# Patient Record
Sex: FEMALE | Race: BLACK OR AFRICAN AMERICAN | Employment: FULL TIME | URBAN - METROPOLITAN AREA
[De-identification: names, ages, dates, MRNs, and addresses within clinical notes are randomized per-mention and may not be internally consistent; named-entity substitution may affect disease eponyms.]

---

## 2017-02-13 ENCOUNTER — OP HISTORICAL/CONVERTED ENCOUNTER (OUTPATIENT)
Dept: OTHER | Age: 42
End: 2017-02-13

## 2019-01-11 ENCOUNTER — OP HISTORICAL/CONVERTED ENCOUNTER (OUTPATIENT)
Dept: OTHER | Age: 44
End: 2019-01-11

## 2019-02-14 ENCOUNTER — OP HISTORICAL/CONVERTED ENCOUNTER (OUTPATIENT)
Dept: OTHER | Age: 44
End: 2019-02-14

## 2019-05-10 ENCOUNTER — ED HISTORICAL/CONVERTED ENCOUNTER (OUTPATIENT)
Dept: OTHER | Age: 44
End: 2019-05-10

## 2020-07-29 VITALS
DIASTOLIC BLOOD PRESSURE: 86 MMHG | HEIGHT: 62 IN | WEIGHT: 159.2 LBS | HEART RATE: 100 BPM | BODY MASS INDEX: 29.3 KG/M2 | OXYGEN SATURATION: 98 % | TEMPERATURE: 98.7 F | SYSTOLIC BLOOD PRESSURE: 131 MMHG

## 2020-07-29 PROBLEM — J44.9 COPD (CHRONIC OBSTRUCTIVE PULMONARY DISEASE) (HCC): Status: ACTIVE | Noted: 2020-07-29

## 2020-07-29 PROBLEM — I10 ESSENTIAL HYPERTENSION: Status: ACTIVE | Noted: 2020-07-29

## 2020-07-29 PROBLEM — F10.10 ALCOHOL ABUSE: Status: ACTIVE | Noted: 2020-07-29

## 2020-07-29 PROBLEM — F41.8 MIXED ANXIETY AND DEPRESSIVE DISORDER: Status: ACTIVE | Noted: 2020-07-29

## 2020-07-29 PROBLEM — F32.A DEPRESSIVE DISORDER: Status: ACTIVE | Noted: 2020-07-29

## 2020-07-29 PROBLEM — M54.9 BACKACHE: Status: ACTIVE | Noted: 2020-07-29

## 2020-07-29 PROBLEM — F17.210 TOBACCO DEPENDENCE DUE TO CIGARETTES: Status: ACTIVE | Noted: 2020-07-29

## 2020-07-29 PROBLEM — E55.9 VITAMIN D DEFICIENCY: Status: ACTIVE | Noted: 2020-07-29

## 2020-07-30 ENCOUNTER — VIRTUAL VISIT (OUTPATIENT)
Dept: INTERNAL MEDICINE CLINIC | Age: 45
End: 2020-07-30
Payer: COMMERCIAL

## 2020-07-30 DIAGNOSIS — F17.210 TOBACCO DEPENDENCE DUE TO CIGARETTES: ICD-10-CM

## 2020-07-30 DIAGNOSIS — J44.9 CHRONIC OBSTRUCTIVE PULMONARY DISEASE, UNSPECIFIED COPD TYPE (HCC): Primary | ICD-10-CM

## 2020-07-30 DIAGNOSIS — F41.8 MIXED ANXIETY AND DEPRESSIVE DISORDER: ICD-10-CM

## 2020-07-30 PROCEDURE — 99213 OFFICE O/P EST LOW 20 MIN: CPT | Performed by: INTERNAL MEDICINE

## 2020-07-30 RX ORDER — CETIRIZINE HCL 10 MG
10 TABLET ORAL DAILY
COMMUNITY

## 2020-07-30 RX ORDER — ALPRAZOLAM 0.5 MG/1
0.5 TABLET ORAL
Qty: 30 TAB | Refills: 1 | Status: SHIPPED | OUTPATIENT
Start: 2020-07-30 | End: 2020-08-29

## 2020-07-30 RX ORDER — ACAMPROSATE CALCIUM 333 MG/1
666 TABLET, DELAYED RELEASE ORAL 3 TIMES DAILY
COMMUNITY

## 2020-07-30 RX ORDER — ALBUTEROL SULFATE 2.5 MG/.5ML
2.5 SOLUTION RESPIRATORY (INHALATION)
COMMUNITY
End: 2021-12-08 | Stop reason: SDUPTHER

## 2020-07-30 RX ORDER — FLUTICASONE FUROATE AND VILANTEROL TRIFENATATE 100; 25 UG/1; UG/1
1 POWDER RESPIRATORY (INHALATION) DAILY
COMMUNITY
End: 2020-07-30 | Stop reason: SDUPTHER

## 2020-07-30 RX ORDER — MOMETASONE FUROATE 200 UG/1
2 AEROSOL RESPIRATORY (INHALATION) 2 TIMES DAILY
COMMUNITY
End: 2020-07-30

## 2020-07-30 RX ORDER — MELOXICAM 15 MG/1
15 TABLET ORAL
COMMUNITY
End: 2020-11-16

## 2020-07-30 RX ORDER — FOLIC ACID 1 MG/1
1 TABLET ORAL DAILY
COMMUNITY
End: 2021-12-08 | Stop reason: ALTCHOICE

## 2020-07-30 RX ORDER — METOPROLOL TARTRATE 50 MG/1
TABLET ORAL EVERY 12 HOURS
COMMUNITY
End: 2021-01-08

## 2020-07-30 RX ORDER — BUPROPION HYDROCHLORIDE 150 MG/1
150 TABLET, EXTENDED RELEASE ORAL 2 TIMES DAILY
COMMUNITY
End: 2020-07-30 | Stop reason: ALTCHOICE

## 2020-07-30 RX ORDER — HYDROXYZINE 25 MG/1
TABLET, FILM COATED ORAL
COMMUNITY
End: 2021-12-08 | Stop reason: ALTCHOICE

## 2020-07-30 RX ORDER — LANOLIN ALCOHOL/MO/W.PET/CERES
CREAM (GRAM) TOPICAL DAILY
COMMUNITY
End: 2021-12-08 | Stop reason: ALTCHOICE

## 2020-07-30 RX ORDER — ALPRAZOLAM 0.5 MG/1
0.5 TABLET ORAL
COMMUNITY
End: 2020-07-30 | Stop reason: SDUPTHER

## 2020-07-30 RX ORDER — SERTRALINE HYDROCHLORIDE 100 MG/1
100 TABLET, FILM COATED ORAL DAILY
COMMUNITY
End: 2021-12-08 | Stop reason: CLARIF

## 2020-07-30 RX ORDER — MOMETASONE FUROATE AND FORMOTEROL FUMARATE DIHYDRATE 100; 5 UG/1; UG/1
2 AEROSOL RESPIRATORY (INHALATION) 2 TIMES DAILY
COMMUNITY
End: 2020-07-30 | Stop reason: ALTCHOICE

## 2020-07-30 RX ORDER — FLUTICASONE FUROATE AND VILANTEROL TRIFENATATE 100; 25 UG/1; UG/1
1 POWDER RESPIRATORY (INHALATION) DAILY
Qty: 3 INHALER | Refills: 1 | Status: SHIPPED | OUTPATIENT
Start: 2020-07-30 | End: 2021-07-23

## 2020-07-30 NOTE — PROGRESS NOTES
Jamar Potts is a 53 y.o. female and presents with Anxiety and Hypertension    THIS VISIT WAS COMPLETED VIA PHONE, WITH PATIENTS CONSENT IN THE SETTING OF CORONAVIRUS PANDEMIC, SHE COULD NOT FOLLOW INSTRUCTIONS TO ACCESS DOXY. ME    COPD: Last visit in May we started Spiriva, she mentioned back then worsening shortness of breath to 1 block, she continued Breo, I referred her to pulmonary, she did not schedule appointment, she lost the referral.   She says the Spiriva has helped her a lot, she does not feel as short of breath    Alcoholism: She has been on acamprosate, last visit she had reduced from 6 beers per day to 1 ounce of beer a day, he has been taking thiamine and folic acid. She says she's still drinking the same     Mixed anxiety and depression:  Last visit depression was better, anxiety was better as well but more anxious and depressed, she has been taking sertraline 100 mg and Xanax as needed only, around 2 times a week, she takes it when she feels very anxious. Needs refill in Xanax   She says she gets very emotional and very dan before her menstrual cycle. Psychiatry referral.   She wanted to keep taking bupropion for smoking cessation, she is smoking now 1/2 a pack a day, has reduced from 2 packs a day     HTN: No blood pressure available today. Needs refill     Review of Systems  Review of Systems   Constitutional: Negative for chills, fatigue, fever and unexpected weight change. HENT: Negative for congestion, ear pain, sneezing and sore throat. Eyes: Negative for pain and discharge. Respiratory: Negative for cough, shortness of breath and wheezing. Cardiovascular: Negative for chest pain, palpitations and leg swelling. Gastrointestinal: Negative for abdominal pain, blood in stool, constipation and diarrhea. Endocrine: Negative for polydipsia and polyuria. Genitourinary: Negative for difficulty urinating, dysuria, frequency, hematuria and urgency.    Musculoskeletal: Negative for arthralgias, back pain and joint swelling. Skin: Negative for rash. Allergic/Immunologic: Negative for environmental allergies and food allergies. Neurological: Negative for dizziness, speech difficulty, weakness, light-headedness, numbness and headaches. Hematological: Negative for adenopathy. Psychiatric/Behavioral: Negative for behavioral problems (Depression), sleep disturbance and suicidal ideas. The patient is nervous/anxious. Past Medical History:   Diagnosis Date    Anemia     Arthritis     Depression     GERD (gastroesophageal reflux disease)     Headache     Lung disorder     Sleep disorder      Past Surgical History:   Procedure Laterality Date    HX HERNIA REPAIR       Social History     Socioeconomic History    Marital status: SINGLE     Spouse name: Not on file    Number of children: Not on file    Years of education: Not on file    Highest education level: Not on file   Tobacco Use    Smoking status: Current Every Day Smoker     Packs/day: 0.50     Years: 15.00     Pack years: 7.50    Smokeless tobacco: Never Used   Substance and Sexual Activity    Alcohol use: Yes     Comment: Heavy     Drug use: Never     Family History   Problem Relation Age of Onset    Hypertension Mother     Diabetes Mother     Heart Disease Father     Bipolar Disorder Paternal Aunt     Schizophrenia Paternal Aunt     Hypertension Maternal Grandmother     Diabetes Maternal Grandfather     Heart Disease Maternal Grandfather      Current Outpatient Medications   Medication Sig Dispense Refill    acamprosate (CAMPRAL) 333 mg tablet Take 666 mg by mouth three (3) times daily.  albuterol sulfate (PROVENTIL;VENTOLIN) 2.5 mg/0.5 mL nebu nebulizer solution 2.5 mg by Nebulization route four (4) times daily as needed for Wheezing.  cetirizine (ZYRTEC) 10 mg tablet Take 10 mg by mouth daily.  folic acid (FOLVITE) 1 mg tablet Take 1 mg by mouth daily.       hydrOXYzine HCL (ATARAX) 25 mg tablet Take  by mouth daily as needed.  meloxicam (MOBIC) 15 mg tablet Take 15 mg by mouth daily as needed for Pain.  metoprolol tartrate (LOPRESSOR) 50 mg tablet Take  by mouth every twelve (12) hours.  sertraline (ZOLOFT) 100 mg tablet Take 100 mg by mouth daily.  thiamine HCL (Vitamin B-1) 100 mg tablet Take  by mouth daily.  ALPRAZolam (XANAX) 0.5 mg tablet Take 1 Tab by mouth daily as needed for Anxiety for up to 30 days. Indications: anxiousness associated with depression 30 Tab 1    tiotropium (Spiriva with HandiHaler) 18 mcg inhalation capsule Take 1 Cap by inhalation daily for 270 days. 90 Cap 2    fluticasone furoate-vilanteroL (Breo Ellipta) 100-25 mcg/dose inhaler Take 1 Puff by inhalation daily for 270 days. Indications: bronchospasm prevention with COPD 3 Inhaler 1     No Known Allergies    Objective: There were no vitals taken for this visit. Physical Exam:   Physical Exam  Neurological:      Mental Status: She is alert and oriented to person, place, and time. Psychiatric:         Mood and Affect: Mood normal.         Behavior: Behavior normal.         Thought Content: Thought content normal.         Judgment: Judgment normal.          No results found for this or any previous visit. Assessment/Plan:    ICD-10-CM ICD-9-CM    1. Chronic obstructive pulmonary disease, unspecified COPD type ([facility]  J44.9 496  she is doing better since she started the Spiriva 2 months ago, shortness of breath has improved, needs refill of Spiriva, continue Breo, she never went to see pulmonary ordering referral again. tiotropium (Spiriva with HandiHaler) 18 mcg inhalation capsule      fluticasone furoate-vilanteroL (Breo Ellipta) 100-25 mcg/dose inhaler      REFERRAL TO PULMONARY DISEASE   2.  Mixed anxiety and depressive disorder  F41.8 300.4  uncontrolled, continue sertraline, instructed her to stop bupropion for medication interaction, she needs to see psychiatry, she mentioned during the call that she also has PTSD and ADHD which she did not tell me before and the is not quite clear for me, she is to see psychiatry    ALPRAZolam (XANAX) 0.5 mg tablet      REFERRAL TO PSYCHIATRY   3. Tobacco dependence due to cigarettes  F17.210 305.1  still smoking has decreased amount, encouraged     Orders Placed This Encounter    REFERRAL TO PULMONARY DISEASE     Referral Priority:   Routine     Referral Type:   Consultation     Referral Reason:   Specialty Services Required     Referred to Provider:   Hernandez Brower MD     Requested Specialty:   Pulmonary Disease     Number of Visits Requested:   1    REFERRAL TO PSYCHIATRY     Referral Priority:   Routine     Referral Type:   Behavioral Health     Referral Reason:   Specialty Services Required     Referred to Provider:   Luis Garces MD     Requested Specialty:   Psychiatry     Number of Visits Requested:   1    acamprosate (CAMPRAL) 333 mg tablet     Sig: Take 666 mg by mouth three (3) times daily.  albuterol sulfate (PROVENTIL;VENTOLIN) 2.5 mg/0.5 mL nebu nebulizer solution     Si.5 mg by Nebulization route four (4) times daily as needed for Wheezing.  DISCONTD: ALPRAZolam (XANAX) 0.5 mg tablet     Sig: Take 0.5 mg by mouth two (2) times daily as needed for Anxiety.  DISCONTD: fluticasone furoate-vilanteroL (Breo Ellipta) 100-25 mcg/dose inhaler     Sig: Take 1 Puff by inhalation daily.  DISCONTD: mometasone (Asmanex HFA) 200 mcg/actuation HFAA inhaler     Sig: Take 2 Puffs by inhalation two (2) times a day.  DISCONTD: buPROPion SR (WELLBUTRIN SR) 150 mg SR tablet     Sig: Take 150 mg by mouth two (2) times a day.  cetirizine (ZYRTEC) 10 mg tablet     Sig: Take 10 mg by mouth daily.  DISCONTD: mometasone-formoterol (Dulera) 100-5 mcg/actuation HFA inhaler     Sig: Take 2 Puffs by inhalation two (2) times a day.  folic acid (FOLVITE) 1 mg tablet     Sig: Take 1 mg by mouth daily.     hydrOXYzine HCL (ATARAX) 25 mg tablet     Sig: Take  by mouth daily as needed.  meloxicam (MOBIC) 15 mg tablet     Sig: Take 15 mg by mouth daily as needed for Pain.  metoprolol tartrate (LOPRESSOR) 50 mg tablet     Sig: Take  by mouth every twelve (12) hours.  sertraline (ZOLOFT) 100 mg tablet     Sig: Take 100 mg by mouth daily.  DISCONTD: tiotropium (Spiriva with HandiHaler) 18 mcg inhalation capsule     Sig: Take 1 Cap by inhalation daily.  thiamine HCL (Vitamin B-1) 100 mg tablet     Sig: Take  by mouth daily.  ALPRAZolam (XANAX) 0.5 mg tablet     Sig: Take 1 Tab by mouth daily as needed for Anxiety for up to 30 days. Indications: anxiousness associated with depression     Dispense:  30 Tab     Refill:  1    tiotropium (Spiriva with HandiHaler) 18 mcg inhalation capsule     Sig: Take 1 Cap by inhalation daily for 270 days. Dispense:  90 Cap     Refill:  2    fluticasone furoate-vilanteroL (Breo Ellipta) 100-25 mcg/dose inhaler     Sig: Take 1 Puff by inhalation daily for 270 days. Indications: bronchospasm prevention with COPD     Dispense:  3 Inhaler     Refill:  1     There are no Patient Instructions on file for this visit. Follow-up and Dispositions    · Return in about 3 months (around 10/30/2020) for in office follow up 30 min.

## 2020-09-24 ENCOUNTER — APPOINTMENT (OUTPATIENT)
Dept: CT IMAGING | Age: 45
End: 2020-09-24
Attending: INTERNAL MEDICINE
Payer: COMMERCIAL

## 2020-09-24 ENCOUNTER — HOSPITAL ENCOUNTER (EMERGENCY)
Age: 45
Discharge: LEFT BEFORE DISCHARGE | End: 2020-09-24
Payer: COMMERCIAL

## 2020-09-24 ENCOUNTER — HOSPITAL ENCOUNTER (EMERGENCY)
Age: 45
Discharge: HOME | End: 2020-09-24
Attending: INTERNAL MEDICINE
Payer: COMMERCIAL

## 2020-09-24 VITALS
HEIGHT: 62 IN | BODY MASS INDEX: 28.89 KG/M2 | HEART RATE: 118 BPM | WEIGHT: 157 LBS | SYSTOLIC BLOOD PRESSURE: 129 MMHG | TEMPERATURE: 98.5 F | DIASTOLIC BLOOD PRESSURE: 80 MMHG | RESPIRATION RATE: 18 BRPM | OXYGEN SATURATION: 98 %

## 2020-09-24 DIAGNOSIS — K43.9 ABDOMINAL WALL HERNIA: Primary | ICD-10-CM

## 2020-09-24 LAB
ALBUMIN SERPL-MCNC: 3.3 G/DL (ref 3.5–5)
ALBUMIN/GLOB SERPL: 0.8 {RATIO} (ref 1.1–2.2)
ALP SERPL-CCNC: 133 U/L (ref 45–117)
ALT SERPL-CCNC: 21 U/L (ref 12–78)
ANION GAP SERPL CALC-SCNC: 10 MMOL/L (ref 5–15)
AST SERPL W P-5'-P-CCNC: 21 U/L (ref 15–37)
BASOPHILS # BLD: 0.1 K/UL (ref 0–0.1)
BASOPHILS NFR BLD: 1 % (ref 0–1)
BILIRUB SERPL-MCNC: 0.3 MG/DL (ref 0.2–1)
BUN SERPL-MCNC: 7 MG/DL (ref 6–20)
BUN/CREAT SERPL: 11 (ref 12–20)
CA-I BLD-MCNC: 8.9 MG/DL (ref 8.5–10.1)
CHLORIDE SERPL-SCNC: 105 MMOL/L (ref 97–108)
CO2 SERPL-SCNC: 28 MMOL/L (ref 21–32)
CREAT SERPL-MCNC: 0.65 MG/DL (ref 0.55–1.02)
DIFFERENTIAL METHOD BLD: ABNORMAL
EOSINOPHIL # BLD: 0.2 K/UL (ref 0–0.4)
EOSINOPHIL NFR BLD: 2 % (ref 0–7)
ERYTHROCYTE [DISTWIDTH] IN BLOOD BY AUTOMATED COUNT: 20.1 % (ref 11.5–14.5)
GLOBULIN SER CALC-MCNC: 4.3 G/DL (ref 2–4)
GLUCOSE SERPL-MCNC: 139 MG/DL (ref 65–100)
HCT VFR BLD AUTO: 31.5 % (ref 35–47)
HGB BLD-MCNC: 9.2 % (ref 11.5–16)
IMM GRANULOCYTES # BLD AUTO: 0 K/UL (ref 0–0.04)
IMM GRANULOCYTES NFR BLD AUTO: 0 % (ref 0–0.5)
LIPASE SERPL-CCNC: 216 U/L (ref 73–393)
LYMPHOCYTES # BLD: 1.9 K/UL (ref 0.8–3.5)
LYMPHOCYTES NFR BLD: 25 % (ref 12–49)
MCH RBC QN AUTO: 18 PG (ref 26–34)
MCHC RBC AUTO-ENTMCNC: 29.2 G/DL (ref 30–36.5)
MCV RBC AUTO: 61.8 FL (ref 80–99)
MONOCYTES # BLD: 0.5 K/UL (ref 0–1)
MONOCYTES NFR BLD: 7 % (ref 5–13)
NEUTS SEG # BLD: 4.9 K/UL (ref 1.8–8)
NEUTS SEG NFR BLD: 65 % (ref 32–75)
PLATELET # BLD AUTO: 353 K/UL (ref 150–400)
PMV BLD AUTO: 9.8 FL (ref 8.9–12.9)
POTASSIUM SERPL-SCNC: 3.3 MMOL/L (ref 3.5–5.1)
PROT SERPL-MCNC: 7.6 G/DL (ref 6.4–8.2)
RBC # BLD AUTO: 5.1 M/UL (ref 3.8–5.2)
SODIUM SERPL-SCNC: 143 MMOL/L (ref 136–145)
WBC # BLD AUTO: 7.5 K/UL (ref 3.6–11)

## 2020-09-24 PROCEDURE — 75810000275 HC EMERGENCY DEPT VISIT NO LEVEL OF CARE

## 2020-09-24 PROCEDURE — 96374 THER/PROPH/DIAG INJ IV PUSH: CPT

## 2020-09-24 PROCEDURE — 99284 EMERGENCY DEPT VISIT MOD MDM: CPT

## 2020-09-24 PROCEDURE — 83690 ASSAY OF LIPASE: CPT

## 2020-09-24 PROCEDURE — 80053 COMPREHEN METABOLIC PANEL: CPT

## 2020-09-24 PROCEDURE — 74176 CT ABD & PELVIS W/O CONTRAST: CPT

## 2020-09-24 PROCEDURE — 85025 COMPLETE CBC W/AUTO DIFF WBC: CPT

## 2020-09-24 PROCEDURE — 74011250636 HC RX REV CODE- 250/636: Performed by: INTERNAL MEDICINE

## 2020-09-24 PROCEDURE — 36415 COLL VENOUS BLD VENIPUNCTURE: CPT

## 2020-09-24 PROCEDURE — 96375 TX/PRO/DX INJ NEW DRUG ADDON: CPT

## 2020-09-24 RX ORDER — ONDANSETRON 2 MG/ML
4 INJECTION INTRAMUSCULAR; INTRAVENOUS
Status: COMPLETED | OUTPATIENT
Start: 2020-09-24 | End: 2020-09-24

## 2020-09-24 RX ORDER — MORPHINE SULFATE 2 MG/ML
2 INJECTION, SOLUTION INTRAMUSCULAR; INTRAVENOUS
Status: COMPLETED | OUTPATIENT
Start: 2020-09-24 | End: 2020-09-24

## 2020-09-24 RX ADMIN — ONDANSETRON 4 MG: 2 INJECTION INTRAMUSCULAR; INTRAVENOUS at 19:54

## 2020-09-24 RX ADMIN — MORPHINE SULFATE 2 MG: 2 INJECTION, SOLUTION INTRAMUSCULAR; INTRAVENOUS at 19:53

## 2020-09-24 RX ADMIN — SODIUM CHLORIDE 1000 ML: 9 INJECTION, SOLUTION INTRAVENOUS at 19:53

## 2020-09-24 NOTE — ED TRIAGE NOTES
Pt has acute on chronic abd discomfort from known hernia. States \"it usually flares up then settles down, but it hasn't settled down today\". Pt states no change to bowel, bladder, appetite, assoc conditions. States just came from main hospital ED, and \"didn't want to wait to be seen, and it was taking too long\".

## 2020-09-25 NOTE — ED PROVIDER NOTES
EMERGENCY DEPARTMENT HISTORY AND PHYSICAL EXAM      Date: 9/24/2020  Patient Name: Courtney Sharma    History of Presenting Illness     Chief Complaint   Patient presents with    Abdominal Pain       History Provided By: Patient    HPI: Courtney Sharma, 53 y.o. female with a past medical history significant    Anemia     Arthritis     Depression     GERD (gastroesophageal reflux disease)     Headache     Lung disorder     Sleep disorder       presents to the ED with cc of abdominal pain, nausea, 8-10 pain, poor historian - Pt has acute on chronic abd discomfort from known hernia. States \"it usually flares up then settles down, but it hasn't settled down today\". Pt states no change to bowel, bladder, appetite, assoc conditions. States just came from main hospital ED, and \"didn't want to wait to be seen, and it was taking too long\". There are no other complaints, changes, or physical findings at this time. PCP: Davina Weber MD    No current facility-administered medications on file prior to encounter. Current Outpatient Medications on File Prior to Encounter   Medication Sig Dispense Refill    acamprosate (CAMPRAL) 333 mg tablet Take 666 mg by mouth three (3) times daily.  albuterol sulfate (PROVENTIL;VENTOLIN) 2.5 mg/0.5 mL nebu nebulizer solution 2.5 mg by Nebulization route four (4) times daily as needed for Wheezing.  cetirizine (ZYRTEC) 10 mg tablet Take 10 mg by mouth daily.  folic acid (FOLVITE) 1 mg tablet Take 1 mg by mouth daily.  hydrOXYzine HCL (ATARAX) 25 mg tablet Take  by mouth daily as needed.  meloxicam (MOBIC) 15 mg tablet Take 15 mg by mouth daily as needed for Pain.  metoprolol tartrate (LOPRESSOR) 50 mg tablet Take  by mouth every twelve (12) hours.  sertraline (ZOLOFT) 100 mg tablet Take 100 mg by mouth daily.  thiamine HCL (Vitamin B-1) 100 mg tablet Take  by mouth daily.       tiotropium (Spiriva with HandiHaler) 18 mcg inhalation capsule Take 1 Cap by inhalation daily for 270 days. 90 Cap 2    fluticasone furoate-vilanteroL (Breo Ellipta) 100-25 mcg/dose inhaler Take 1 Puff by inhalation daily for 270 days. Indications: bronchospasm prevention with COPD 3 Inhaler 1       Past History     Past Medical History:  Past Medical History:   Diagnosis Date    Anemia     Arthritis     Depression     GERD (gastroesophageal reflux disease)     Headache     Lung disorder     Sleep disorder        Past Surgical History:  Past Surgical History:   Procedure Laterality Date    HX HERNIA REPAIR         Family History:  Family History   Problem Relation Age of Onset    Hypertension Mother     Diabetes Mother     Heart Disease Father     Bipolar Disorder Paternal Aunt     Schizophrenia Paternal Aunt     Hypertension Maternal Grandmother     Diabetes Maternal Grandfather     Heart Disease Maternal Grandfather        Social History:  Social History     Tobacco Use    Smoking status: Current Every Day Smoker     Packs/day: 0.50     Years: 15.00     Pack years: 7.50    Smokeless tobacco: Never Used   Substance Use Topics    Alcohol use: Yes     Comment: Heavy     Drug use: Never       Allergies:  No Known Allergies      Review of Systems     Review of Systems   Constitutional: Negative. HENT: Negative. Respiratory: Negative. Cardiovascular: Negative. Gastrointestinal: Positive for abdominal pain and nausea. Negative for diarrhea. Genitourinary: Negative. Neurological: Negative. Physical Exam     Physical Exam  Vitals signs and nursing note reviewed. Constitutional:       Appearance: She is well-developed. She is not diaphoretic. HENT:      Head: Normocephalic and atraumatic. Eyes:      Conjunctiva/sclera: Conjunctivae normal.      Pupils: Pupils are equal, round, and reactive to light. Neck:      Musculoskeletal: Normal range of motion and neck supple.    Cardiovascular:      Rate and Rhythm: Normal rate and regular rhythm. Heart sounds: Normal heart sounds. No murmur. No friction rub. No gallop. Pulmonary:      Effort: Pulmonary effort is normal. No respiratory distress. Breath sounds: Normal breath sounds. No wheezing or rales. Abdominal:      General: Bowel sounds are normal.      Palpations: Abdomen is soft. Tenderness: There is no abdominal tenderness. There is no guarding or rebound. Comments: Pain, hernia present, BS present. Musculoskeletal: Normal range of motion. General: No tenderness. Lymphadenopathy:      Cervical: No cervical adenopathy. Skin:     General: Skin is warm and dry. Findings: No ecchymosis, erythema, lesion or rash. Rash is not urticarial.   Neurological:      Mental Status: She is alert and oriented to person, place, and time. Cranial Nerves: No cranial nerve deficit. Sensory: No sensory deficit. Coordination: Coordination normal.      Gait: Gait normal.         Diagnostic Study Results     Labs -     Recent Results (from the past 12 hour(s))   CBC WITH AUTOMATED DIFF    Collection Time: 09/24/20  7:30 PM   Result Value Ref Range    WBC 7.5 3.6 - 11.0 K/uL    RBC 5.10 3.80 - 5.20 M/uL    HGB 9.2 (L) 11.5 - 16.0 %    HCT 31.5 (L) 35.0 - 47.0 %    MCV 61.8 (L) 80.0 - 99.0 FL    MCH 18.0 (L) 26.0 - 34.0 PG    MCHC 29.2 (L) 30.0 - 36.5 g/dL    RDW 20.1 (H) 11.5 - 14.5 %    PLATELET 707 717 - 089 K/uL    MPV 9.8 8.9 - 12.9 FL    NEUTROPHILS 65 32 - 75 %    LYMPHOCYTES 25 12 - 49 %    MONOCYTES 7 5 - 13 %    EOSINOPHILS 2 0 - 7 %    BASOPHILS 1 0 - 1 %    IMMATURE GRANULOCYTES 0 0.0 - 0.5 %    ABS. NEUTROPHILS 4.9 1.8 - 8.0 K/UL    ABS. LYMPHOCYTES 1.9 0.8 - 3.5 K/UL    ABS. MONOCYTES 0.5 0.0 - 1.0 K/UL    ABS. EOSINOPHILS 0.2 0.0 - 0.4 K/UL    ABS. BASOPHILS 0.1 0.0 - 0.1 K/UL    ABS. IMM.  GRANS. 0.0 0.00 - 0.04 K/UL    DF AUTOMATED     METABOLIC PANEL, COMPREHENSIVE    Collection Time: 09/24/20  7:30 PM Result Value Ref Range    Sodium 143 136 - 145 mmol/L    Potassium 3.3 (L) 3.5 - 5.1 mmol/L    Chloride 105 97 - 108 mmol/L    CO2 28 21 - 32 mmol/L    Anion gap 10 5 - 15 mmol/L    Glucose 139 (H) 65 - 100 mg/dL    BUN 7 6 - 20 mg/dL    Creatinine 0.65 0.55 - 1.02 mg/dL    BUN/Creatinine ratio 11 (L) 12 - 20      GFR est AA >60 >60 ml/min/1.73m2    GFR est non-AA >60 >60 ml/min/1.73m2    Calcium 8.9 8.5 - 10.1 mg/dL    Bilirubin, total 0.3 0.2 - 1.0 mg/dL    AST (SGOT) 21 15 - 37 U/L    ALT (SGPT) 21 12 - 78 U/L    Alk. phosphatase 133 (H) 45 - 117 U/L    Protein, total 7.6 6.4 - 8.2 g/dL    Albumin 3.3 (L) 3.5 - 5.0 g/dL    Globulin 4.3 (H) 2.0 - 4.0 g/dL    A-G Ratio 0.8 (L) 1.1 - 2.2     LIPASE    Collection Time: 09/24/20  7:30 PM   Result Value Ref Range    Lipase 216 73 - 393 U/L       Radiologic Studies -   @[email or web address]@  CT Results  (Last 48 hours)               09/24/20 2042  CT ABD PELV WO CONT Final result    Impression:  Impression:   Nonspecific mild reticulonodular disease seen in the lower lung zones. Multiple [place] ventral hernias are present in the upper and lower anterior   abdominal wall. These hernias contain mesenteric fat, as well as small and large   bowel loops. No bowel obstruction. No inflammatory changes are seen in the   hernia sacs. Cholelithiasis. Enlarged fibroid uterus. Narrative:  Technique:    Axial images of abdomen and pelvis without intravenous contrast injection. Oral   contrast is not used. Multiplanar reformatted images are also reviewed. Dose Reduction:  All CT scans at this facility are performed using dose   reduction optimization techniques as appropriate to a performed exam including   the following: Automated exposure control, adjustments of the mA and/or kV   according to patient size, or use of iterative reconstruction technique. Comparison:  CT scan abdomen and pelvis January 2, 2014.  Not, CT scan from   February 14, 2019 is not available for review. Findings:       Nonspecific reticulonodular interstitial disease involves visualized lung   fields. Recommend clinical correlation. This could be related to   inflammatory/infectious process. Unremarkable liver. Tiny calcification present within the gallbladder suggesting   cholelithiasis. Unremarkable spleen, pancreas, bilateral kidneys and bilateral   adrenal glands. No hydronephrosis. No renal or ureteral calculus seen. Bile   ducts do not appear dilated. There are multiple [place] ventral hernias   involving upper and lower anterior abdominal wall, containing mesenteric fat,   and segments of small and large bowel loops. No definite evidence of bowel   obstruction. No dilated loops of bowel. Changes from previous bowel anastomosis   is noted. Numerous calcified phleboliths are present in the pelvis. Enlarged uterus,   likely due to multiple fibroids. Unremarkable urinary bladder. Unremarkable   bilateral adnexa. No pelvic mass or lymphadenopathy. No evidence for ascites, focal fluid collection, or free intraperitoneal air in   the abdomen or pelvis. No suspicious osseous lesions. There is asymmetric sclerotic change involving   the bilateral posterior iliac bones, most likely representing osteitis illi. CXR Results  (Last 48 hours)    None            Medical Decision Making   I am the first provider for this patient. I reviewed the vital signs, available nursing notes, past medical history, past surgical history, family history and social history. Vital Signs-Reviewed the patient's vital signs.   Patient Vitals for the past 12 hrs:   Temp Pulse Resp BP SpO2   09/24/20 1929 98.5 °F (36.9 °C) (!) 118 18 129/80 98 %       Records Reviewed: Nursing Notes    The patient presents with abdominal pain with a differential diagnosis of abdominal pain, hernia, sbo, ileus      Provider Notes (Medical Decision Making):     MDM  Number of Diagnoses or Management Options     Amount and/or Complexity of Data Reviewed  Clinical lab tests: ordered and reviewed  Tests in the radiology section of CPT®: ordered and reviewed  Obtain history from someone other than the patient: no  Discuss the patient with other providers: no    Risk of Complications, Morbidity, and/or Mortality  Presenting problems: moderate  Management options: moderate    Patient Progress  Patient progress: improved           ED Course:   Initial assessment performed. The patients presenting problems have been discussed, and they are in agreement with the care plan formulated and outlined with them. I have encouraged them to ask questions as they arise throughout their visit. I explained the patient's laboratory data with her her CT scan findings patient does have a caregiver with her recommendations to follow with outpatient surgery patient is able to tolerate ice chips no acute abdomen findings no indication for acute surgical intervention patient agrees with care plan           PROCEDURES  Procedures         PLAN:  1. Current Discharge Medication List        2. Follow-up Information     Follow up With Specialties Details Why [address], [place], [place] Internal Medicine   [address] [place] [address],[facility]  [phone]      Follow up with your PCP  Schedule an appointment as soon as possible for a visit in 1 day          Return to ED if worse     Diagnosis     Clinical Impression:   1.  Abdominal wall hernia

## 2020-09-28 ENCOUNTER — OFFICE VISIT (OUTPATIENT)
Dept: INTERNAL MEDICINE CLINIC | Age: 45
End: 2020-09-28
Payer: COMMERCIAL

## 2020-09-28 VITALS
OXYGEN SATURATION: 99 % | HEIGHT: 62 IN | WEIGHT: 152.2 LBS | SYSTOLIC BLOOD PRESSURE: 123 MMHG | TEMPERATURE: 98.9 F | HEART RATE: 102 BPM | DIASTOLIC BLOOD PRESSURE: 84 MMHG | BODY MASS INDEX: 28.01 KG/M2

## 2020-09-28 DIAGNOSIS — N92.0 MENORRHAGIA WITH REGULAR CYCLE: ICD-10-CM

## 2020-09-28 DIAGNOSIS — Z23 NEEDS FLU SHOT: ICD-10-CM

## 2020-09-28 DIAGNOSIS — R73.9 HYPERGLYCEMIA: ICD-10-CM

## 2020-09-28 DIAGNOSIS — I10 ESSENTIAL HYPERTENSION: ICD-10-CM

## 2020-09-28 DIAGNOSIS — D25.9 UTERINE LEIOMYOMA, UNSPECIFIED LOCATION: ICD-10-CM

## 2020-09-28 DIAGNOSIS — K43.9 VENTRAL HERNIA WITHOUT OBSTRUCTION OR GANGRENE: Primary | ICD-10-CM

## 2020-09-28 DIAGNOSIS — D50.9 MICROCYTIC ANEMIA: ICD-10-CM

## 2020-09-28 PROCEDURE — 99496 TRANSJ CARE MGMT HIGH F2F 7D: CPT | Performed by: INTERNAL MEDICINE

## 2020-09-28 PROCEDURE — 99214 OFFICE O/P EST MOD 30 MIN: CPT | Performed by: INTERNAL MEDICINE

## 2020-09-28 PROCEDURE — 90756 CCIIV4 VACC ABX FREE IM: CPT | Performed by: INTERNAL MEDICINE

## 2020-09-28 RX ORDER — BUPROPION HYDROCHLORIDE 150 MG/1
150 TABLET ORAL
COMMUNITY
End: 2020-12-08

## 2020-09-28 RX ORDER — SERTRALINE HYDROCHLORIDE 50 MG/1
TABLET, FILM COATED ORAL DAILY
COMMUNITY
End: 2020-11-16

## 2020-09-28 RX ORDER — ALBUTEROL SULFATE 90 UG/1
2 AEROSOL, METERED RESPIRATORY (INHALATION)
COMMUNITY
End: 2020-11-16

## 2020-09-28 RX ORDER — ALPRAZOLAM 0.5 MG/1
0.5 TABLET ORAL
COMMUNITY
End: 2021-01-08

## 2020-09-28 NOTE — LETTER
9/28/2020 3:32 PM 
 
Ms. Lyubov Aguirre [address] [place] [phone] To whom it may concern, Kiran Carranza was here today in appointment with me. Sincerely, Christiano Santiago MD

## 2020-09-28 NOTE — PROGRESS NOTES
Chief Complaint   Patient presents with    Abdominal Pain     Pt states that she went and saw her consoler and she was concerned about her hernia

## 2020-09-28 NOTE — PROGRESS NOTES
Esther Cruz is a 53 y.o. female and presents with Abdominal Pain    Acute visit. She says 5 days ago she developed abdominal pain in the night, so, she went to the ER, she says was the same pain she's had intermittently associated to her multiple ventral hernias. Reviewed the ER note and results, CT of the abdomen showed multiple ventral hernia but no evidence of gangrene or obstruction, labs show anemia with hemoglobin of 9.2, microcytic, potassium of 3.3, glucose 139, mild elevation in alkaline phosphatase. The CT of the abdomen shows also enlarged uterus with fibroids, she says th  at she has very heavy periods which are regular, they are so heavy that in the first 3 days of her periods he has to use diapers. She has not seen OB/GYN in years, she has not done a Pap smear in years. Right now she denies any abdominal pain, she denies blood in stools, melena, nausea, vomiting, diarrhea, constipation. Review of Systems  Review of Systems   Constitutional: Negative for chills, fatigue, fever and unexpected weight change. HENT: Negative for congestion, ear pain, sneezing and sore throat. Eyes: Negative for pain and discharge. Respiratory: Negative for cough, shortness of breath and wheezing. Cardiovascular: Negative for chest pain, palpitations and leg swelling. Gastrointestinal: Negative for abdominal pain, blood in stool, constipation and diarrhea. Endocrine: Negative for polydipsia and polyuria. Genitourinary: Negative for difficulty urinating, dysuria, frequency, hematuria and urgency. Musculoskeletal: Negative for arthralgias, back pain and joint swelling. Skin: Negative for rash. Allergic/Immunologic: Negative for environmental allergies and food allergies. Neurological: Negative for dizziness, speech difficulty, weakness, light-headedness, numbness and headaches. Hematological: Negative for adenopathy.    Psychiatric/Behavioral: Negative for behavioral problems (Depression), sleep disturbance and suicidal ideas. Past Medical History:   Diagnosis Date    Anemia     Arthritis     Depression     GERD (gastroesophageal reflux disease)     Headache     Lung disorder     Sleep disorder      Past Surgical History:   Procedure Laterality Date    HX HERNIA REPAIR       Social History     Socioeconomic History    Marital status: SINGLE     Spouse name: Not on file    Number of children: Not on file    Years of education: Not on file    Highest education level: Not on file   Tobacco Use    Smoking status: Current Every Day Smoker     Packs/day: 0.50     Years: 15.00     Pack years: 7.50    Smokeless tobacco: Never Used   Substance and Sexual Activity    Alcohol use: Yes     Comment: Heavy     Drug use: Never     Family History   Problem Relation Age of Onset    Hypertension Mother     Diabetes Mother     Heart Disease Father     Bipolar Disorder Paternal Aunt     Schizophrenia Paternal Aunt     Hypertension Maternal Grandmother     Diabetes Maternal Grandfather     Heart Disease Maternal Grandfather      Current Outpatient Medications   Medication Sig Dispense Refill    sertraline (ZOLOFT) 50 mg tablet Take  by mouth daily.  buPROPion XL (WELLBUTRIN XL) 150 mg tablet Take 150 mg by mouth every morning.  albuterol (PROVENTIL HFA, VENTOLIN HFA, PROAIR HFA) 90 mcg/actuation inhaler Take 2 Puffs by inhalation every six (6) hours as needed for Wheezing.  ALPRAZolam (XANAX) 0.5 mg tablet Take 0.5 mg by mouth two (2) times daily as needed for Anxiety.  acamprosate (CAMPRAL) 333 mg tablet Take 666 mg by mouth three (3) times daily.  albuterol sulfate (PROVENTIL;VENTOLIN) 2.5 mg/0.5 mL nebu nebulizer solution 2.5 mg by Nebulization route four (4) times daily as needed for Wheezing.  cetirizine (ZYRTEC) 10 mg tablet Take 10 mg by mouth daily.  folic acid (FOLVITE) 1 mg tablet Take 1 mg by mouth daily.       hydrOXYzine HCL (ATARAX) 25 mg tablet Take  by mouth daily as needed.  meloxicam (MOBIC) 15 mg tablet Take 15 mg by mouth daily as needed for Pain.  metoprolol tartrate (LOPRESSOR) 50 mg tablet Take  by mouth every twelve (12) hours.  sertraline (ZOLOFT) 100 mg tablet Take 100 mg by mouth daily.  thiamine HCL (Vitamin B-1) 100 mg tablet Take  by mouth daily.  tiotropium (Spiriva with HandiHaler) 18 mcg inhalation capsule Take 1 Cap by inhalation daily for 270 days. 90 Cap 2    fluticasone furoate-vilanteroL (Breo Ellipta) 100-25 mcg/dose inhaler Take 1 Puff by inhalation daily for 270 days. Indications: bronchospasm prevention with COPD 3 Inhaler 1     No Known Allergies    Objective:  Visit Vitals  /84 (BP 1 Location: Left arm, BP Patient Position: Sitting)   Pulse (!) 102   Temp 98.9 °F (37.2 °C) (Oral)   Ht 5' 2\" (1.575 m)   Wt 152 lb 3.2 oz (69 kg)   LMP 09/01/2020   SpO2 99% Comment: RA   BMI 27.84 kg/m²     Physical Exam:   Physical Exam  Constitutional:       General: She is not in acute distress. Appearance: Normal appearance. HENT:      Head: Normocephalic and atraumatic. Mouth/Throat:      Mouth: Mucous membranes are moist.   Eyes:      Extraocular Movements: Extraocular movements intact. Conjunctiva/sclera: Conjunctivae normal.      Pupils: Pupils are equal, round, and reactive to light. Neck:      Musculoskeletal: Normal range of motion and neck supple. Cardiovascular:      Rate and Rhythm: Normal rate and regular rhythm. Pulses: Normal pulses. Heart sounds: Normal heart sounds. Pulmonary:      Effort: Pulmonary effort is normal.      Breath sounds: Normal breath sounds. Abdominal:      General: Abdomen is flat. Bowel sounds are normal. There is no distension. Palpations: Abdomen is soft. There is no mass. Tenderness: There is no abdominal tenderness. Comments: Multiple ventral hernias, all reducible, nontender.    Musculoskeletal: General: No swelling or deformity. Right lower leg: No edema. Left lower leg: No edema. Lymphadenopathy:      Cervical: No cervical adenopathy. Skin:     General: Skin is warm and dry. Capillary Refill: Capillary refill takes less than 2 seconds. Coloration: Skin is not jaundiced or pale. Findings: No erythema or rash. Neurological:      General: No focal deficit present. Mental Status: She is alert and oriented to person, place, and time. Psychiatric:         Mood and Affect: Mood normal.         Behavior: Behavior normal.         Thought Content: Thought content normal.         Judgment: Judgment normal.          Results for orders placed or performed during the hospital encounter of 09/24/20   CBC WITH AUTOMATED DIFF   Result Value Ref Range    WBC 7.5 3.6 - 11.0 K/uL    RBC 5.10 3.80 - 5.20 M/uL    HGB 9.2 (L) 11.5 - 16.0 %    HCT 31.5 (L) 35.0 - 47.0 %    MCV 61.8 (L) 80.0 - 99.0 FL    MCH 18.0 (L) 26.0 - 34.0 PG    MCHC 29.2 (L) 30.0 - 36.5 g/dL    RDW 20.1 (H) 11.5 - 14.5 %    PLATELET 051 126 - 647 K/uL    MPV 9.8 8.9 - 12.9 FL    NEUTROPHILS 65 32 - 75 %    LYMPHOCYTES 25 12 - 49 %    MONOCYTES 7 5 - 13 %    EOSINOPHILS 2 0 - 7 %    BASOPHILS 1 0 - 1 %    IMMATURE GRANULOCYTES 0 0.0 - 0.5 %    ABS. NEUTROPHILS 4.9 1.8 - 8.0 K/UL    ABS. LYMPHOCYTES 1.9 0.8 - 3.5 K/UL    ABS. MONOCYTES 0.5 0.0 - 1.0 K/UL    ABS. EOSINOPHILS 0.2 0.0 - 0.4 K/UL    ABS. BASOPHILS 0.1 0.0 - 0.1 K/UL    ABS. IMM.  GRANS. 0.0 0.00 - 0.04 K/UL    DF AUTOMATED     METABOLIC PANEL, COMPREHENSIVE   Result Value Ref Range    Sodium 143 136 - 145 mmol/L    Potassium 3.3 (L) 3.5 - 5.1 mmol/L    Chloride 105 97 - 108 mmol/L    CO2 28 21 - 32 mmol/L    Anion gap 10 5 - 15 mmol/L    Glucose 139 (H) 65 - 100 mg/dL    BUN 7 6 - 20 mg/dL    Creatinine 0.65 0.55 - 1.02 mg/dL    BUN/Creatinine ratio 11 (L) 12 - 20      GFR est AA >60 >60 ml/min/1.73m2    GFR est non-AA >60 >60 ml/min/1.73m2    Calcium 8.9 8.5 - 10.1 mg/dL    Bilirubin, total 0.3 0.2 - 1.0 mg/dL    AST (SGOT) 21 15 - 37 U/L    ALT (SGPT) 21 12 - 78 U/L    Alk. phosphatase 133 (H) 45 - 117 U/L    Protein, total 7.6 6.4 - 8.2 g/dL    Albumin 3.3 (L) 3.5 - 5.0 g/dL    Globulin 4.3 (H) 2.0 - 4.0 g/dL    A-G Ratio 0.8 (L) 1.1 - 2.2     LIPASE   Result Value Ref Range    Lipase 216 73 - 393 U/L       Assessment/Plan:    Has been having intermittent episodes of abdominal pain secondary to the multiple ventral hernias, no evidence of obstruction, they are reducible, she was supposed to follow up before with Dr. Enedina Armstrong about this but she did not, and giving her new referral for surgery. Additional findings in this ER visit she has urine fibroids, which are associated to menorrhagia, microcytic anemia, she needs to see OB/GYN. I am doing iron profile. Her glucose was 139 on the labs done at the hospital, will repeat CMP, A1c and TFTs. She is fasting today    ICD-10-CM ICD-9-CM    1. Ventral hernia without obstruction or gangrene  K43.9 553.20 REFERRAL TO GENERAL SURGERY   2. Microcytic anemia  D50.9 280.9 IRON PROFILE      FERRITIN      REFERRAL TO GYNECOLOGY   3. Uterine leiomyoma, unspecified location  D25.9 218.9 REFERRAL TO GYNECOLOGY   4. Menorrhagia with regular cycle  N92.0 626.2 IRON PROFILE      FERRITIN      REFERRAL TO GYNECOLOGY   5. Needs flu shot  Z23 V04.81 INFLUENZA VACCINE (CCIIV4 VACCINE ANTIBIO FREE 0.5 ML)   6. Essential hypertension  G17 601.3 METABOLIC PANEL, COMPREHENSIVE      T4, FREE      TSH 3RD GENERATION   7.  Hyperglycemia  S12.2 929.69 METABOLIC PANEL, COMPREHENSIVE      HEMOGLOBIN A1C WITH EAG     Orders Placed This Encounter    Influenza Vaccine, QUAD, Vial (Flucelvax VIAL 54028)    METABOLIC PANEL, COMPREHENSIVE    T4, FREE    TSH 3RD GENERATION    IRON PROFILE    FERRITIN    HEMOGLOBIN A1C WITH EAG    REFERRAL TO GENERAL SURGERY     Referral Priority:   Routine     Referral Type:   Consultation     Referral Reason: Specialty Services Required     Referred to Provider:   Arpit Fontanez MD     Number of Visits Requested:   1    REFERRAL TO GYNECOLOGY     Referral Priority:   Routine     Referral Type:   Consultation     Referral Reason:   Specialty Services Required     Referred to Provider:   Anne Ayon MD     Number of Visits Requested:   1    sertraline (ZOLOFT) 50 mg tablet     Sig: Take  by mouth daily.  buPROPion XL (WELLBUTRIN XL) 150 mg tablet     Sig: Take 150 mg by mouth every morning.  albuterol (PROVENTIL HFA, VENTOLIN HFA, PROAIR HFA) 90 mcg/actuation inhaler     Sig: Take 2 Puffs by inhalation every six (6) hours as needed for Wheezing.  ALPRAZolam (XANAX) 0.5 mg tablet     Sig: Take 0.5 mg by mouth two (2) times daily as needed for Anxiety. \  There are no Patient Instructions on file for this visit. Follow-up and Dispositions    · Return in about 2 months (around 11/28/2020).

## 2020-10-14 VITALS
WEIGHT: 159.2 LBS | TEMPERATURE: 98.7 F | BODY MASS INDEX: 29.3 KG/M2 | OXYGEN SATURATION: 98 % | SYSTOLIC BLOOD PRESSURE: 131 MMHG | RESPIRATION RATE: 18 BRPM | HEART RATE: 100 BPM | DIASTOLIC BLOOD PRESSURE: 86 MMHG | HEIGHT: 62 IN

## 2020-10-14 PROBLEM — F10.90 ALCOHOL INTAKE ABOVE RECOMMENDED SENSIBLE LIMITS: Status: ACTIVE | Noted: 2020-10-14

## 2020-10-14 PROBLEM — F17.200 TOBACCO DEPENDENCE SYNDROME: Status: ACTIVE | Noted: 2020-10-14

## 2020-10-14 PROBLEM — K42.9 UMBILICAL HERNIA: Status: ACTIVE | Noted: 2020-10-14

## 2020-10-14 PROBLEM — K43.2 RECURRENT VENTRAL INCISIONAL HERNIA: Status: ACTIVE | Noted: 2020-10-14

## 2020-10-14 RX ORDER — PREDNISONE 10 MG/1
TABLET ORAL
COMMUNITY
End: 2020-11-16

## 2020-10-14 RX ORDER — ALPRAZOLAM 0.25 MG/1
TABLET ORAL
COMMUNITY
End: 2020-11-16

## 2020-10-14 RX ORDER — MOMETASONE FUROATE AND FORMOTEROL FUMARATE DIHYDRATE 100; 5 UG/1; UG/1
2 AEROSOL RESPIRATORY (INHALATION) 2 TIMES DAILY
COMMUNITY
End: 2020-11-16

## 2020-10-14 RX ORDER — HYDROXYZINE PAMOATE 25 MG/1
25 CAPSULE ORAL
COMMUNITY

## 2020-10-14 RX ORDER — TIOTROPIUM BROMIDE AND OLODATEROL 3.124; 2.736 UG/1; UG/1
SPRAY, METERED RESPIRATORY (INHALATION)
COMMUNITY
End: 2020-11-16

## 2020-10-14 RX ORDER — GUAIFENESIN 600 MG/1
600 TABLET, EXTENDED RELEASE ORAL 2 TIMES DAILY
COMMUNITY
End: 2021-12-08 | Stop reason: ALTCHOICE

## 2020-10-14 RX ORDER — UMECLIDINIUM BROMIDE AND VILANTEROL TRIFENATATE 62.5; 25 UG/1; UG/1
1 POWDER RESPIRATORY (INHALATION) DAILY
COMMUNITY
End: 2020-11-16

## 2020-11-05 RX ORDER — ALBUTEROL SULFATE 0.83 MG/ML
SOLUTION RESPIRATORY (INHALATION)
Qty: 300 ML | Refills: 0 | Status: SHIPPED | OUTPATIENT
Start: 2020-11-05 | End: 2021-02-08

## 2020-11-16 ENCOUNTER — OFFICE VISIT (OUTPATIENT)
Dept: SURGERY | Age: 45
End: 2020-11-16
Payer: COMMERCIAL

## 2020-11-16 VITALS
SYSTOLIC BLOOD PRESSURE: 130 MMHG | DIASTOLIC BLOOD PRESSURE: 105 MMHG | WEIGHT: 152 LBS | OXYGEN SATURATION: 97 % | HEIGHT: 62 IN | BODY MASS INDEX: 27.97 KG/M2 | TEMPERATURE: 97.8 F | HEART RATE: 101 BPM

## 2020-11-16 DIAGNOSIS — K43.2 RECURRENT VENTRAL INCISIONAL HERNIA: Primary | ICD-10-CM

## 2020-11-16 PROCEDURE — 99203 OFFICE O/P NEW LOW 30 MIN: CPT | Performed by: COLON & RECTAL SURGERY

## 2020-11-16 NOTE — PROGRESS NOTES
OFFICE VISIT NOTE    Althea Lombard is a 53 y.o. female who presents to the office today for:    Chief Complaint   Patient presents with    New Patient    Hernia (Non Specific)       The patient is a 57-year-old female who is referred for evaluation of a complex ventral incisional hernia. She states that she had surgery originally for what sounds to be perforated bowel and 2010 and then subsequently had repair of ventral hernia in 2014 and then a recurrence and repeat repair later the same year. Since that time she has developed a complex ventral hernia. She does report that it is painful at times particular when she coughs. She denies any obstructive symptoms. She denies any nausea or vomiting. Had a CT scan of the abdomen pelvis when she went to the emergency department complaining of abdominal pain. This demonstrated:    Nonspecific reticulonodular interstitial disease involves visualized lung  fields. Recommend clinical correlation. This could be related to  inflammatory/infectious process. Unremarkable liver. Tiny calcification present within the gallbladder suggesting  cholelithiasis. Unremarkable spleen, pancreas, bilateral kidneys and bilateral  adrenal glands. No hydronephrosis. No renal or ureteral calculus seen. Bile  ducts do not appear dilated. There are multiple widemouth ventral hernias  involving upper and lower anterior abdominal wall, containing mesenteric fat,  and segments of small and large bowel loops. No definite evidence of bowel  obstruction. No dilated loops of bowel. Changes from previous bowel anastomosis  is noted. I personally reviewed the images myself. She also gives a significant history of menorrhagia. She was supposed to see Dr. Maciel Forrest last week however missed the appointment.     She otherwise has a history of hypertension, COPD, GERD, arthritis, depression. She is here today as she with her mental health coordinator.       Past Medical History:   Diagnosis Date    Anemia     Arthritis     COPD (chronic obstructive pulmonary disease) ([address]     Depression     GERD (gastroesophageal reflux disease)     Headache     Hypertension     Lung disorder     Sleep disorder        Past Surgical History:   Procedure Laterality Date    HX HERNIA REPAIR  08/01/2014    HX HERNIA REPAIR  01/01/2014    HX HERNIA REPAIR  08/01/2008    HX TUBAL LIGATION Bilateral        Family History   Problem Relation Age of Onset    Hypertension Mother     Diabetes Mother     Heart Disease Father     Bipolar Disorder Paternal Aunt     Schizophrenia Paternal Aunt     Hypertension Maternal Grandmother     Diabetes Maternal Grandfather     Heart Disease Maternal Grandfather        Social History     Socioeconomic History    Marital status: SINGLE     Spouse name: Not on file    Number of children: Not on file    Years of education: Not on file    Highest education level: Not on file   Occupational History    Not on file   Social Needs    Financial resource strain: Not on file    Food insecurity     Worry: Not on file     Inability: Not on file    Transportation needs     Medical: Not on file     Non-medical: Not on file   Tobacco Use    Smoking status: Current Every Day Smoker     Packs/day: 0.50     Years: 15.00     Pack years: 7.50    Smokeless tobacco: Never Used   Substance and Sexual Activity    Alcohol use: Yes     Comment: Heavy     Drug use: Never    Sexual activity: Not on file   Lifestyle    Physical activity     Days per week: Not on file     Minutes per session: Not on file    Stress: Not on file   Relationships    Social connections     Talks on phone: Not on file     Gets together: Not on file     Attends Judaism service: Not on file     Active member of club or organization: Not on file     Attends meetings of clubs or organizations: Not on file     Relationship status: Not on file    Intimate partner violence     Fear of current or ex partner: Not on file     Emotionally abused: Not on file     Physically abused: Not on file     Forced sexual activity: Not on file   Other Topics Concern    Not on file   Social History Narrative    Not on file       No Known Allergies    Current Outpatient Medications   Medication Sig    albuterol (PROVENTIL VENTOLIN) 2.5 mg /3 mL (0.083 %) nebu INHALE 1 VIAL VIA NEBULIZER UP TO 4 TIMES ADAY IF NEEDED FOR SHORTNESS OF BREATH/COUGH/WHEEZE.  hydrOXYzine pamoate (VISTARIL) 25 mg capsule Take 25 mg by mouth three (3) times daily as needed.  guaiFENesin ER (Mucus Relief ER) 600 mg ER tablet Take 600 mg by mouth two (2) times a day.  buPROPion XL (WELLBUTRIN XL) 150 mg tablet Take 150 mg by mouth every morning.  ALPRAZolam (XANAX) 0.5 mg tablet Take 0.5 mg by mouth two (2) times daily as needed for Anxiety.  acamprosate (CAMPRAL) 333 mg tablet Take 666 mg by mouth three (3) times daily.  albuterol sulfate (PROVENTIL;VENTOLIN) 2.5 mg/0.5 mL nebu nebulizer solution 2.5 mg by Nebulization route four (4) times daily as needed for Wheezing.  cetirizine (ZYRTEC) 10 mg tablet Take 10 mg by mouth daily.  folic acid (FOLVITE) 1 mg tablet Take 1 mg by mouth daily.  hydrOXYzine HCL (ATARAX) 25 mg tablet Take  by mouth daily as needed.  metoprolol tartrate (LOPRESSOR) 50 mg tablet Take  by mouth every twelve (12) hours.  sertraline (ZOLOFT) 100 mg tablet Take 100 mg by mouth daily.  thiamine HCL (Vitamin B-1) 100 mg tablet Take  by mouth daily.  tiotropium (Spiriva with HandiHaler) 18 mcg inhalation capsule Take 1 Cap by inhalation daily for 270 days.  fluticasone furoate-vilanteroL (Breo Ellipta) 100-25 mcg/dose inhaler Take 1 Puff by inhalation daily for 270 days.  Indications: bronchospasm prevention with COPD     No current facility-administered medications for this visit. Review of Systems   Constitutional: Positive for malaise/fatigue. HENT: Positive for sinus pain. Eyes: Negative. Respiratory: Positive for cough and shortness of breath. Cardiovascular: Negative. Gastrointestinal: Positive for abdominal pain. Genitourinary: Negative. Musculoskeletal: Positive for back pain, joint pain and myalgias. Skin: Negative. Neurological: Negative. Endo/Heme/Allergies: Negative. Psychiatric/Behavioral: Positive for depression and memory loss. The patient is nervous/anxious. BP (!) 130/105 (BP 1 Location: Left arm, BP Patient Position: Sitting)   Pulse (!) 101   Temp 97.8 °F (36.6 °C)   Ht 5' 2\" (1.575 m)   Wt 152 lb (68.9 kg)   SpO2 97%   BMI 27.80 kg/m²     Physical Exam  Constitutional:       Appearance: Normal appearance. HENT:      Head: Normocephalic and atraumatic. Neck:      Musculoskeletal: Normal range of motion and neck supple. Cardiovascular:      Rate and Rhythm: Normal rate. Heart sounds: Normal heart sounds. Pulmonary:      Effort: Pulmonary effort is normal.      Breath sounds: Normal breath sounds. Abdominal:      Comments: She has multiple complex hernias. The lower one is partially reducible, the upper hernia is reducible. She is tender to palpation at her hernia sites. Musculoskeletal: Normal range of motion. Skin:     General: Skin is warm and dry. Neurological:      General: No focal deficit present. Mental Status: She is alert and oriented to person, place, and time. Psychiatric:         Mood and Affect: Mood normal.         Thought Content: Thought content normal.         Judgment: Judgment normal.         Problem List Items Addressed This Visit        Other    Recurrent ventral incisional hernia - Primary          Assessment and Plan:    I told Ms. Stefan Piper that her hernia is very very complex and the fact that they have been present now for 6 years and the size of the hernias there is a chance that she has she has loss of domain. I told her that this would make surgical correction difficult. I also recommended that she see gynecology to see if they want to do a hysterectomy for her menorrhagia with her fibroid disease as this could be done at the same time as her hernia repair. She is going to follow-up with gynecology and come back and see me in 1 month.           Philip Brink MD

## 2020-12-08 ENCOUNTER — OFFICE VISIT (OUTPATIENT)
Dept: OBGYN CLINIC | Age: 45
End: 2020-12-08
Payer: COMMERCIAL

## 2020-12-08 VITALS
WEIGHT: 155 LBS | SYSTOLIC BLOOD PRESSURE: 149 MMHG | BODY MASS INDEX: 28.52 KG/M2 | HEART RATE: 134 BPM | DIASTOLIC BLOOD PRESSURE: 90 MMHG | HEIGHT: 62 IN

## 2020-12-08 DIAGNOSIS — N92.0 MENORRHAGIA WITH REGULAR CYCLE: ICD-10-CM

## 2020-12-08 DIAGNOSIS — K43.9 ABDOMINAL WALL HERNIA: ICD-10-CM

## 2020-12-08 DIAGNOSIS — Z12.4 SCREENING FOR CERVICAL CANCER: ICD-10-CM

## 2020-12-08 DIAGNOSIS — D25.9 UTERINE LEIOMYOMA, UNSPECIFIED LOCATION: Primary | ICD-10-CM

## 2020-12-08 PROCEDURE — 99214 OFFICE O/P EST MOD 30 MIN: CPT | Performed by: OBSTETRICS & GYNECOLOGY

## 2020-12-08 NOTE — PATIENT INSTRUCTIONS
Uterine Fibroids: Care Instructions  Your Care Instructions     Uterine fibroids are growths in the uterus. Fibroids aren't cancer. Doctors don't know what causes fibroids. Fibroids are very common in women during their childbearing years. Fibroids can grow on the inside of the uterus, in the muscle wall of the uterus, or near the outside wall of the uterus. In some women, fibroids cause painful cramps and heavy periods. In these cases, taking anti-inflammatory medicines, birth control pills, or using an intrauterine device (IUD) often helps decrease symptoms. Sometimes surgery is needed to treat fibroids. But if you are near menopause, you may want to wait and see if your symptoms get better. Most fibroids shrink and go away after menopause, when your menstrual periods stop completely. Follow-up care is a key part of your treatment and safety. Be sure to make and go to all appointments, and call your doctor if you are having problems. It's also a good idea to know your test results and keep a list of the medicines you take. How can you care for yourself at home? · If your doctor gave you medicine, take it as exactly as prescribed. Be safe with medicines. Call your doctor if you think you are having a problem with your medicine. · Take anti-inflammatory medicines for pain. These include ibuprofen (Advil, Motrin) and naproxen (Aleve). Read and follow all instructions on the label. · Use heat, such as a hot water bottle or a heating pad set on low, or a warm bath to relax tense muscles and relieve cramping. Put a thin cloth between the heating pad and your skin. Never go to sleep with a heating pad on. · Lie down and put a pillow under your knees. Or, lie on your side and bring your knees up to your chest. These positions may help relieve belly pain or pressure. · Keep track of how many sanitary pads or tampons you use each day. · Get at least 30 minutes of exercise on most days of the week.  Walking is a good choice. You also may want to do other activities, such as running, swimming, cycling, or playing tennis or team sports. · If you bleed longer than usual or have heavy bleeding, take a daily multivitamin with iron. When should you call for help? Call your doctor now or seek immediate medical care if:    · You have severe vaginal bleeding.     · You have new or worse belly or pelvic pain. Watch closely for changes in your health, and be sure to contact your doctor if:    · You have unusual vaginal bleeding.     · You do not get better as expected. Where can you learn more? Go to http://eva-angel.info/  Enter B121 in the search box to learn more about \"Uterine Fibroids: Care Instructions. \"  Current as of: November 8, 2019               Content Version: 12.6  © 4909-4537 [employer]. Care instructions adapted under license by [employer] (which disclaims liability or warranty for this information). If you have questions about a medical condition or this instruction, always ask your healthcare professional. [address] any warranty or liability for your use of this information.

## 2020-12-08 NOTE — PROGRESS NOTES
HPI: Cheryl Loyola is a 50 y.o. female [id number], LMP 11/17/2020, who presents today for the following:  Chief Complaint   Patient presents with    New Patient     Patient was sent by PCP for Uterine Fibroids    Pelvic Pain      Patient states she has been diagnosed with uterine fibroids in the past.  She was referred to OB/GYN for possible hysterectomy due to associated anemia. She states she has heavy menses. Her menses last 5 days. She uses adult diapers for the first 3 days and changes 3-4 times a day. Her history is complicated with ventral wall hernias that contain bowel and fat but no obstruction. She has seen Dr. Yoselyn Sullivan who is considering hernia repair but wanted her to consult with OB/GYN first.  Patient desires a hysterectomy. CT a&p from 2/14/2019:  Interval uterine enlargement with multiple masses consistent with fibroids. Uterus 9.6 cm longitudinal x6.3 cm AP x8.1 cm transverse. .. Impression large multifocal abdominal ventral hernia containing small and large bowel which does not appear obstructed enlarged fibroid uterus. Thick-walled urinary bladder may be seen with chronic inflammation. Cholelithiasis. CT a&p 9/24/2020:  Enlarged uterus,  likely due to multiple fibroids. ... Impression:  Nonspecific mild reticulonodular disease seen in the lower lung zones. Multiple widemouth ventral hernias are present in the upper and lower anterior  abdominal wall. These hernias contain mesenteric fat, as well as small and large  bowel loops. No bowel obstruction. No inflammatory changes are seen in the  hernia sacs. Cholelithiasis. Enlarged fibroid uterus.       Past Medical History:   Diagnosis Date    Anemia     Arthritis     COPD (chronic obstructive pulmonary disease) ([address]     Depression     GERD (gastroesophageal reflux disease)     Headache     Hypertension     Lung disorder     Sleep disorder        Past Surgical History:   Procedure Laterality Date    HX HERNIA REPAIR 08/01/2014    HX HERNIA REPAIR  01/01/2014    HX HERNIA REPAIR  08/01/2008    HX TUBAL LIGATION Bilateral        Family History   Problem Relation Age of Onset    Hypertension Mother     Diabetes Mother     Heart Disease Father     Bipolar Disorder Paternal Aunt     Schizophrenia Paternal Aunt     Hypertension Maternal Grandmother     Diabetes Maternal Grandfather     Heart Disease Maternal Grandfather        Social History     Socioeconomic History    Marital status: SINGLE     Spouse name: Not on file    Number of children: Not on file    Years of education: Not on file    Highest education level: Not on file   Occupational History    Not on file   Social Needs    Financial resource strain: Not on file    Food insecurity     Worry: Not on file     Inability: Not on file    Transportation needs     Medical: Not on file     Non-medical: Not on file   Tobacco Use    Smoking status: Current Every Day Smoker     Packs/day: 0.50     Years: 15.00     Pack years: 7.50    Smokeless tobacco: Never Used   Substance and Sexual Activity    Alcohol use: Yes     Comment: Heavy     Drug use: Never    Sexual activity: Not Currently   Lifestyle    Physical activity     Days per week: Not on file     Minutes per session: Not on file    Stress: Not on file   Relationships    Social connections     Talks on phone: Not on file     Gets together: Not on file     Attends Jehovah's witness service: Not on file     Active member of club or organization: Not on file     Attends meetings of clubs or organizations: Not on file     Relationship status: Not on file    Intimate partner violence     Fear of current or ex partner: Not on file     Emotionally abused: Not on file     Physically abused: Not on file     Forced sexual activity: Not on file   Other Topics Concern    Not on file   Social History Narrative    Not on file         Review of Systems: Denies issues with eyes, ears, mouth, nose.  Denies fevers/chills, significant weight loss/gain. Denies chest pain, nausea, vomiting, constipation, diarrhea or abdominal pain. Chronic shortness of breath and wheezing with Copd. Denies dysuria. Denies weakness, numbness or tingling. +Muscle aches. Denies bleeding/clotting d/o's. Denies S/HI. +Anxiety/depression.     OBJECTIVE:  BP (!) 149/90 (BP 1 Location: Left arm, BP Patient Position: Sitting)   Pulse (!) 134   Ht 5' 2\" (1.575 m)   Wt 155 lb (70.3 kg)   BMI 28.35 kg/m²      Constitutional  · Appearance: well-nourished, well developed, alert, in no acute distress, overweight    HENT  · Head and Face: appears normal    Neck  · Inspection/Palpation: normal appearance      Chest  · Respiratory Effort: normal      Gastrointestinal  Abdominal Examination: ventral wall hernias noted, soft, with palpable intra-abdominal contents, non tender; vertical scar incision noted from prior surgery from supraumbilical to pubic region    Genitourinary  · External Genitalia: normal appearance for age, no discharge present, no tenderness present, no inflammatory lesions present, no masses present, no atrophy present  · Vagina: normal vaginal vault without central or paravaginal defects, no discharge present, no inflammatory lesions present, no masses present  · Bladder: non-tender to palpation  · Urethra: appears normal   · Cervix: normal, no cervical motion tenderness or abnormal discharge; pap smear obtained  · Uterus: enlarged, difficult to palpate uterine fundus due to abdominal wall hernias  · Adnexa: no adnexal tenderness present, no adnexal masses present  · Perineum: perineum within normal limits, no evidence of trauma, no rashes or skin lesions present  · Anus: anus within normal limits, no hemorrhoids present    Skin  · General Inspection: no rash, no lesions identified    Neurologic/Psychiatric  · Mental Status:  · Orientation: grossly oriented to person, place and time  · Mood and Affect: mood normal, affect appropriate          Assessment/plan:    ICD-10-CM ICD-9-CM    1. Uterine leiomyoma, unspecified location  D25.9 218.9 REFERRAL TO GYN ONCOLOGY      US PELV NON OBS   2. Menorrhagia with regular cycle  N92.0 626.2 REFERRAL TO GYN ONCOLOGY   3. Abdominal wall hernia  K43.9 553.20 REFERRAL TO GYN ONCOLOGY   4. Screening for cervical cancer  Z12.4 V76.2 PAP IG, APTIMA HPV AND RFX 16/18,45 ([id number])     Discussed obtaining a pelvic ultrasound to further characterize the fibroids. Due to high risk status (COPD, smoker, alcohol abuse, multiple abdominal surgeries, ventral wall hernias), recommend gyn onc referral to see if patient is appropriate candidate for a hysterectomy. Pap smear obtained in the interim. She declines STI testing.

## 2020-12-10 PROBLEM — D25.9 UTERINE LEIOMYOMA: Status: ACTIVE | Noted: 2020-12-10

## 2020-12-10 PROBLEM — N92.0 MENORRHAGIA WITH REGULAR CYCLE: Status: ACTIVE | Noted: 2020-12-10

## 2020-12-14 VITALS
DIASTOLIC BLOOD PRESSURE: 86 MMHG | BODY MASS INDEX: 29.3 KG/M2 | HEART RATE: 100 BPM | WEIGHT: 159.2 LBS | OXYGEN SATURATION: 98 % | RESPIRATION RATE: 18 BRPM | SYSTOLIC BLOOD PRESSURE: 131 MMHG | TEMPERATURE: 98.7 F | HEIGHT: 62 IN

## 2020-12-14 RX ORDER — MOMETASONE FUROATE AND FORMOTEROL FUMARATE DIHYDRATE 100; 5 UG/1; UG/1
2 AEROSOL RESPIRATORY (INHALATION) 2 TIMES DAILY
COMMUNITY
End: 2021-12-08 | Stop reason: ALTCHOICE

## 2020-12-14 RX ORDER — SERTRALINE HYDROCHLORIDE 50 MG/1
TABLET, FILM COATED ORAL DAILY
COMMUNITY
End: 2021-12-08 | Stop reason: CLARIF

## 2020-12-14 RX ORDER — BUPROPION HYDROCHLORIDE 150 MG/1
TABLET, EXTENDED RELEASE ORAL 2 TIMES DAILY
COMMUNITY
End: 2021-12-08 | Stop reason: ALTCHOICE

## 2020-12-14 RX ORDER — PREDNISONE 10 MG/1
TABLET ORAL
COMMUNITY
End: 2021-12-08 | Stop reason: ALTCHOICE

## 2020-12-14 RX ORDER — TIOTROPIUM BROMIDE AND OLODATEROL 3.124; 2.736 UG/1; UG/1
SPRAY, METERED RESPIRATORY (INHALATION)
COMMUNITY
End: 2021-12-08 | Stop reason: ALTCHOICE

## 2020-12-15 LAB
CYTOLOGIST CVX/VAG CYTO: NORMAL
CYTOLOGY CVX/VAG DOC CYTO: NORMAL
CYTOLOGY CVX/VAG DOC THIN PREP: NORMAL
DX ICD CODE: NORMAL
HPV I/H RISK 4 DNA CVX QL PROBE+SIG AMP: NEGATIVE
Lab: NORMAL
Lab: NORMAL
OTHER STN SPEC: NORMAL
STAT OF ADQ CVX/VAG CYTO-IMP: NORMAL

## 2020-12-22 ENCOUNTER — TELEPHONE (OUTPATIENT)
Dept: OBGYN CLINIC | Age: 45
End: 2020-12-22

## 2020-12-22 NOTE — TELEPHONE ENCOUNTER
----- Message from Bandar Handley MD sent at 12/18/2020  1:53 PM EST -----  pls let her know her pap smear was negative.

## 2020-12-30 ENCOUNTER — OFFICE VISIT (OUTPATIENT)
Dept: SURGERY | Age: 45
End: 2020-12-30
Payer: COMMERCIAL

## 2020-12-30 VITALS
HEART RATE: 111 BPM | HEIGHT: 62 IN | DIASTOLIC BLOOD PRESSURE: 83 MMHG | TEMPERATURE: 97.8 F | SYSTOLIC BLOOD PRESSURE: 148 MMHG | WEIGHT: 159 LBS | OXYGEN SATURATION: 97 % | BODY MASS INDEX: 29.26 KG/M2

## 2020-12-30 DIAGNOSIS — K43.2 RECURRENT VENTRAL INCISIONAL HERNIA: Primary | ICD-10-CM

## 2020-12-30 DIAGNOSIS — D25.9 UTERINE LEIOMYOMA, UNSPECIFIED LOCATION: ICD-10-CM

## 2020-12-30 PROCEDURE — 99214 OFFICE O/P EST MOD 30 MIN: CPT | Performed by: COLON & RECTAL SURGERY

## 2020-12-30 NOTE — PROGRESS NOTES
OFFICE VISIT NOTE    Sondra Walker is a 50 y.o. female who presents to the office today for:    Chief Complaint   Patient presents with    Follow-up     Recurrent ventral incisional hernia       Ms. Axel Plunkett comes in today for follow-up of her complex ventral incisional hernia. She initially had repair of a ventral hernia in 2014 which she developed after having abdominal surgery in 2010. She states that she almost immediately had a recurrence and repeat repair later the same year. Since her second repair she has developed a complex recurrence. She did have a CT scan of the abdomen pelvis which demonstrated multiple widemouth ventral hernias involving the upper and lower anterior abdominal wall containing mesenteric fat and segments of small and large bowel loops. She also has a history of menorrhagia with uterine leiomyoma. She has since seen Dr. Valente Villela and has been referred to gynecology oncology/Dr. Bahman Springer. The patient states she is scheduled to have surgery by Dr. Bahman Springer at [facility] on January 22, 2020. She states that she is not having a hysterectomy but that she is doing a less invasive procedure however she does not know the name of the procedure. She does continue reports of abdominal pain. She denies any obstructive symptoms. She is tolerating a diet with no difficulty.         Past Medical History:   Diagnosis Date    ADHD     Anemia     Arthritis     COPD (chronic obstructive pulmonary disease) ([address]     Depression     GERD (gastroesophageal reflux disease)     Headache     Hypertension     Lung disorder     Sleep disorder     insomnia       Past Surgical History:   Procedure Laterality Date    HX HERNIA REPAIR  08/01/2014    HX HERNIA REPAIR  01/01/2014    HX HERNIA REPAIR  08/01/2008    states she has had 5 hernia repairs total    HX TUBAL LIGATION Bilateral        Family History   Problem Relation Age of Onset    Hypertension Mother     Diabetes Mother     Heart Disease Father     Bipolar Disorder Paternal Aunt     Schizophrenia Paternal Aunt     Hypertension Maternal Grandmother     Diabetes Maternal Grandfather     Heart Disease Maternal Grandfather     Breast Cancer Neg Hx     Uterine Cancer Neg Hx     Colon Cancer Neg Hx     Ovarian Cancer Neg Hx        Social History     Socioeconomic History    Marital status: SINGLE     Spouse name: Not on file    Number of children: 4    Years of education: Not on file    Highest education level:  Bachelor's degree (e.g., BA, AB, BS)   Occupational History    Occupation: residential direct support personnel   Social Needs    Financial resource strain: Not on file    Food insecurity     Worry: Not on file     Inability: Not on file   PHARMAJET needs     Medical: Not on file     Non-medical: Not on file   Tobacco Use    Smoking status: Current Every Day Smoker     Packs/day: 0.50     Years: 15.00     Pack years: 7.50     Types: Cigarettes    Smokeless tobacco: Never Used   Substance and Sexual Activity    Alcohol use: Yes     Drinks per session: 3 or 4     Comment: Heavy     Drug use: Never    Sexual activity: Not Currently     Comment: h/o Trichomonas and HPV; last pap smear multiple yrs ago   Lifestyle    Physical activity     Days per week: Not on file     Minutes per session: Not on file    Stress: Not on file   Relationships    Social connections     Talks on phone: Not on file     Gets together: Not on file     Attends Anabaptism service: Not on file     Active member of club or organization: Not on file     Attends meetings of clubs or organizations: Not on file     Relationship status: Not on file    Intimate partner violence     Fear of current or ex partner: Not on file     Emotionally abused: Not on file     Physically abused: Not on file     Forced sexual activity: Not on file   Other Topics Concern    Not on file   Social History Narrative    Not on file       No Known Allergies    Current Outpatient Medications   Medication Sig    mometasone-formoterol (Dulera) 100-5 mcg/actuation HFA inhaler Take 2 Puffs by inhalation two (2) times a day.  buPROPion SR (WELLBUTRIN SR) 150 mg SR tablet Take  by mouth two (2) times a day.  predniSONE (DELTASONE) 10 mg tablet Take  by mouth daily (with breakfast).  sertraline (ZOLOFT) 50 mg tablet Take  by mouth daily.  tiotropium-olodateroL (Stiolto Respimat) 2.5-2.5 mcg/actuation inhaler Take  by inhalation.  albuterol (PROVENTIL VENTOLIN) 2.5 mg /3 mL (0.083 %) nebu INHALE 1 VIAL VIA NEBULIZER UP TO 4 TIMES ADAY IF NEEDED FOR SHORTNESS OF BREATH/COUGH/WHEEZE.  hydrOXYzine pamoate (VISTARIL) 25 mg capsule Take 25 mg by mouth three (3) times daily as needed.  guaiFENesin ER (Mucus Relief ER) 600 mg ER tablet Take 600 mg by mouth two (2) times a day.  ALPRAZolam (XANAX) 0.5 mg tablet Take 0.5 mg by mouth two (2) times daily as needed for Anxiety.  acamprosate (CAMPRAL) 333 mg tablet Take 666 mg by mouth three (3) times daily.  albuterol sulfate (PROVENTIL;VENTOLIN) 2.5 mg/0.5 mL nebu nebulizer solution 2.5 mg by Nebulization route four (4) times daily as needed for Wheezing.  cetirizine (ZYRTEC) 10 mg tablet Take 10 mg by mouth daily.  folic acid (FOLVITE) 1 mg tablet Take 1 mg by mouth daily.  hydrOXYzine HCL (ATARAX) 25 mg tablet Take  by mouth daily as needed.  metoprolol tartrate (LOPRESSOR) 50 mg tablet Take  by mouth every twelve (12) hours.  sertraline (ZOLOFT) 100 mg tablet Take 100 mg by mouth daily.  thiamine HCL (Vitamin B-1) 100 mg tablet Take  by mouth daily.  tiotropium (Spiriva with HandiHaler) 18 mcg inhalation capsule Take 1 Cap by inhalation daily for 270 days.     fluticasone furoate-vilanteroL (Breo Ellipta) 100-25 mcg/dose inhaler Take 1 Puff by inhalation daily for 270 days. Indications: bronchospasm prevention with COPD     No current facility-administered medications for this visit. Review of Systems   Constitutional: Positive for malaise/fatigue. HENT: Positive for sinus pain. Eyes: Negative. Respiratory: Positive for cough and shortness of breath. Cardiovascular: Negative. Gastrointestinal: Positive for abdominal pain. Genitourinary: Negative. Musculoskeletal: Positive for back pain, joint pain and myalgias. Skin: Negative. Neurological: Negative. Endo/Heme/Allergies: Negative. Psychiatric/Behavioral: Positive for depression and memory loss. The patient is nervous/anxious. BP (!) 148/83 (BP 1 Location: Left arm, BP Patient Position: Sitting)   Pulse (!) 111   Temp 97.8 °F (36.6 °C)   Ht 5' 2\" (1.575 m)   Wt 159 lb (72.1 kg)   SpO2 97%   BMI 29.08 kg/m²     Physical Exam  Constitutional:       Appearance: Normal appearance. HENT:      Head: Normocephalic and atraumatic. Neck:      Musculoskeletal: Normal range of motion and neck supple. Cardiovascular:      Rate and Rhythm: Normal rate. Heart sounds: Normal heart sounds. Pulmonary:      Effort: Pulmonary effort is normal.      Breath sounds: Normal breath sounds. Abdominal:      Comments: Multiple abdominal wall hernia defects. The largest is in the upper midline and right lateral to midline. Both of these are almost completely reducible. Musculoskeletal: Normal range of motion. Skin:     General: Skin is warm and dry. Neurological:      General: No focal deficit present. Mental Status: She is alert and oriented to person, place, and time. Psychiatric:         Mood and Affect: Mood normal.         Thought Content:  Thought content normal.         Judgment: Judgment normal.         Problem List Items Addressed This Visit        Reproductive    Uterine leiomyoma       Other    Recurrent ventral incisional hernia - Primary          Assessment and Plan:  I told Ms. Ricky Strong that certainly she will need to heal from her surgery that she has scheduled at [facility] on January 22 prior to intervention for hernias. She is not to come back and see me in the office on February 10, 2021 at that point we will discuss surgical options and schedule surgery. If she develops any significant abdominal pain or obstructive symptoms and instructed her to go to the emergency department.             Yassine Flores MD

## 2021-01-05 DIAGNOSIS — F41.8 MIXED ANXIETY AND DEPRESSIVE DISORDER: Primary | ICD-10-CM

## 2021-01-08 RX ORDER — METOPROLOL TARTRATE 50 MG/1
TABLET ORAL
Qty: 60 TAB | Refills: 0 | Status: SHIPPED | OUTPATIENT
Start: 2021-01-08 | End: 2021-07-02

## 2021-01-08 RX ORDER — ALPRAZOLAM 0.5 MG/1
TABLET ORAL
Qty: 30 TAB | Refills: 0 | Status: SHIPPED | OUTPATIENT
Start: 2021-01-08 | End: 2021-03-18

## 2021-02-08 RX ORDER — ALBUTEROL SULFATE 0.83 MG/ML
SOLUTION RESPIRATORY (INHALATION)
Qty: 300 ML | Refills: 4 | Status: SHIPPED | OUTPATIENT
Start: 2021-02-08

## 2021-03-15 DIAGNOSIS — F41.8 MIXED ANXIETY AND DEPRESSIVE DISORDER: ICD-10-CM

## 2021-03-18 RX ORDER — ALPRAZOLAM 0.5 MG/1
TABLET ORAL
Qty: 30 TAB | Refills: 4 | Status: SHIPPED | OUTPATIENT
Start: 2021-03-18

## 2021-03-18 RX ORDER — ALBUTEROL SULFATE 90 UG/1
AEROSOL, METERED RESPIRATORY (INHALATION)
Qty: 8.5 G | Refills: 3 | Status: SHIPPED | OUTPATIENT
Start: 2021-03-18 | End: 2021-12-08 | Stop reason: SDUPTHER

## 2021-07-02 RX ORDER — METOPROLOL TARTRATE 50 MG/1
TABLET ORAL
Qty: 60 TABLET | Refills: 0 | Status: SHIPPED | OUTPATIENT
Start: 2021-07-02 | End: 2021-09-03

## 2021-07-21 DIAGNOSIS — J44.9 CHRONIC OBSTRUCTIVE PULMONARY DISEASE, UNSPECIFIED COPD TYPE (HCC): ICD-10-CM

## 2021-07-23 RX ORDER — FLUTICASONE FUROATE AND VILANTEROL TRIFENATATE 100; 25 UG/1; UG/1
POWDER RESPIRATORY (INHALATION)
Qty: 1 INHALER | Refills: 4 | Status: SHIPPED | OUTPATIENT
Start: 2021-07-23

## 2021-08-09 ENCOUNTER — TRANSCRIBE ORDER (OUTPATIENT)
Dept: SCHEDULING | Age: 46
End: 2021-08-09

## 2021-08-09 DIAGNOSIS — N93.9 VAGINA BLEEDING: ICD-10-CM

## 2021-08-09 DIAGNOSIS — D25.9 FIBROID, UTERINE: Primary | ICD-10-CM

## 2021-08-09 DIAGNOSIS — Z97.5 PRESENCE OF IUD: ICD-10-CM

## 2021-09-03 RX ORDER — METOPROLOL TARTRATE 50 MG/1
TABLET ORAL
Qty: 60 TABLET | Refills: 0 | Status: SHIPPED | OUTPATIENT
Start: 2021-09-03 | End: 2021-12-08 | Stop reason: SDUPTHER

## 2021-11-10 DIAGNOSIS — F41.8 MIXED ANXIETY AND DEPRESSIVE DISORDER: ICD-10-CM

## 2021-11-12 RX ORDER — ALPRAZOLAM 0.5 MG/1
TABLET ORAL
Qty: 30 TABLET | Refills: 0 | OUTPATIENT
Start: 2021-11-12

## 2021-12-08 ENCOUNTER — OFFICE VISIT (OUTPATIENT)
Dept: INTERNAL MEDICINE CLINIC | Age: 46
End: 2021-12-08
Payer: COMMERCIAL

## 2021-12-08 VITALS
HEART RATE: 109 BPM | WEIGHT: 150.4 LBS | DIASTOLIC BLOOD PRESSURE: 78 MMHG | RESPIRATION RATE: 18 BRPM | HEIGHT: 62 IN | OXYGEN SATURATION: 95 % | SYSTOLIC BLOOD PRESSURE: 126 MMHG | TEMPERATURE: 99.9 F | BODY MASS INDEX: 27.68 KG/M2

## 2021-12-08 DIAGNOSIS — Z11.59 NEED FOR HEPATITIS C SCREENING TEST: Primary | ICD-10-CM

## 2021-12-08 DIAGNOSIS — D50.9 MICROCYTIC ANEMIA: ICD-10-CM

## 2021-12-08 DIAGNOSIS — I10 ESSENTIAL HYPERTENSION: ICD-10-CM

## 2021-12-08 DIAGNOSIS — J44.9 CHRONIC OBSTRUCTIVE PULMONARY DISEASE, UNSPECIFIED COPD TYPE (HCC): ICD-10-CM

## 2021-12-08 DIAGNOSIS — Z78.9 ALCOHOL USE: ICD-10-CM

## 2021-12-08 DIAGNOSIS — F17.210 TOBACCO DEPENDENCE DUE TO CIGARETTES: ICD-10-CM

## 2021-12-08 DIAGNOSIS — F41.8 MIXED ANXIETY AND DEPRESSIVE DISORDER: ICD-10-CM

## 2021-12-08 PROCEDURE — 99214 OFFICE O/P EST MOD 30 MIN: CPT | Performed by: INTERNAL MEDICINE

## 2021-12-08 RX ORDER — BUSPIRONE HYDROCHLORIDE 5 MG/1
TABLET ORAL
COMMUNITY
Start: 2021-08-31

## 2021-12-08 RX ORDER — ALBUTEROL SULFATE 90 UG/1
2 AEROSOL, METERED RESPIRATORY (INHALATION)
Qty: 8.5 G | Refills: 3 | Status: SHIPPED | OUTPATIENT
Start: 2021-12-08

## 2021-12-08 RX ORDER — SERTRALINE HYDROCHLORIDE 100 MG/1
100 TABLET, FILM COATED ORAL DAILY
Qty: 90 TABLET | Refills: 0 | Status: SHIPPED | OUTPATIENT
Start: 2021-12-08 | End: 2022-03-08

## 2021-12-08 RX ORDER — METOPROLOL TARTRATE 50 MG/1
50 TABLET ORAL 2 TIMES DAILY
Qty: 180 TABLET | Refills: 1 | Status: SHIPPED | OUTPATIENT
Start: 2021-12-08 | End: 2022-06-06

## 2021-12-08 NOTE — PROGRESS NOTES
1. Have you been to the ER, urgent care clinic since your last visit? Hospitalized since your last visit? No    2. Have you seen or consulted any other health care providers outside of the [address] [place] [address] since your last visit? Include any pap smears or colon screening.  Yes When: Jan 2021 Where: [facility] Reason for visit: D and C      Chief Complaint   Patient presents with    Follow-up    Hypertension    COPD    Depression    Anxiety     Pt states that she is here for a f/u visit

## 2021-12-08 NOTE — PROGRESS NOTES
Carmen Haddad is a 48 y.o. female and presents with Follow-up, Hypertension, COPD, Depression, and Anxiety    I have not seen Lyubov since September 2020, she called recently about a refill on alprazolam which I did not approve since I have not seen her in such a long time. She has depression and anxiety. She saysher kids stress her too much, they do not help around the house, when she gets back from work her home is very messy, she says she enjoys working because it keeps her busy. She says she feels that issue with her kids is what causes the anxiety mainly. She says she uses the alprazolam very seldom when she feels she's very very anxious maybe once or twice a month, she's still taking the sertraline. She says she was seeing psychiatry but she has not been there for a very long time, she's not doing counseling, she says at [facility] Dr. Orlando Jovel. No thoughts of self garcia, has continud taking sertraline. She's actively drinking beer every day around 40 oz. Comntinues smoking, using her inhaler, which helps with cough and sob. Usually she feels sob after walking around 100 ft or if she's carryingg something heavy, she feeels the sob has been worse in the past 6 months. She has not seen pulmonary. Smoking a half a pack day. She wants to try quitting. She watns to quit slowly decreasing the amount of cigarettes, does not have a quitting goal date        Review of Systems  Review of Systems   Constitutional: Negative for chills, fatigue, fever and unexpected weight change. HENT: Negative for congestion, ear pain, sneezing and sore throat. Eyes: Negative for pain and discharge. Respiratory: Negative for cough, shortness of breath and wheezing. Cardiovascular: Negative for chest pain, palpitations and leg swelling. Gastrointestinal: Negative for abdominal pain, blood in stool, constipation and diarrhea. Endocrine: Negative for polydipsia and polyuria.    Genitourinary: Negative for difficulty urinating, dysuria, frequency, hematuria and urgency. Musculoskeletal: Negative for arthralgias, back pain and joint swelling. Skin: Negative for rash. Allergic/Immunologic: Negative for environmental allergies and food allergies. Neurological: Negative for dizziness, speech difficulty, weakness, light-headedness, numbness and headaches. Hematological: Negative for adenopathy. Psychiatric/Behavioral: Negative for behavioral problems (Depression), sleep disturbance and suicidal ideas. Past Medical History:   Diagnosis Date    ADHD     Anemia     Arthritis     COPD (chronic obstructive pulmonary disease) ([facility] [address]     Depression     GERD (gastroesophageal reflux disease)     Headache     Hypertension     Lung disorder     Sleep disorder     insomnia     Past Surgical History:   Procedure Laterality Date    HX HERNIA REPAIR  08/01/2014    HX HERNIA REPAIR  01/01/2014    HX HERNIA REPAIR  08/01/2008    states she has had 5 hernia repairs total    HX TUBAL LIGATION Bilateral      Social History     Socioeconomic History    Marital status: SINGLE    Number of children: 4    Highest education level:  Bachelor's degree (e.g., BA, AB, BS)   Occupational History    Occupation: residential direct support personnel   Tobacco Use    Smoking status: Current Every Day Smoker     Packs/day: 0.50     Years: 15.00     Pack years: 7.50     Types: Cigarettes    Smokeless tobacco: Never Used   Vaping Use    Vaping Use: Never used   Substance and Sexual Activity    Alcohol use: Yes     Comment: Heavy     Drug use: Never    Sexual activity: Not Currently     Comment: h/o Trichomonas and HPV; last pap smear multiple yrs ago     Family History   Problem Relation Age of Onset    Hypertension Mother     Diabetes Mother     Heart Disease Father     Bipolar Disorder Paternal Aunt     Schizophrenia Paternal Aunt     Hypertension Maternal Grandmother     Diabetes Maternal Grandfather     Heart Disease Maternal Grandfather     Breast Cancer Neg Hx     Uterine Cancer Neg Hx     Colon Cancer Neg Hx     Ovarian Cancer Neg Hx      Current Outpatient Medications   Medication Sig Dispense Refill    busPIRone (BUSPAR) 5 mg tablet       metoprolol tartrate (LOPRESSOR) 50 mg tablet Take 1 Tablet by mouth two (2) times a day for 180 days. 180 Tablet 1    albuterol (PROVENTIL HFA, VENTOLIN HFA, PROAIR HFA) 90 mcg/actuation inhaler Take 2 Puffs by inhalation every six (6) hours as needed for Wheezing. 8.5 g 3    sertraline (ZOLOFT) 100 mg tablet Take 1 Tablet by mouth daily for 90 days. 90 Tablet 0    Breo Ellipta 100-25 mcg/dose inhaler INHALE 1 PUFF ONCE DAILY 1 Inhaler 4    ALPRAZolam (XANAX) 0.5 mg tablet TAKE 1 TABLET BY MOUTH DAILY AS NEEDED FOR ANXIETY 30 Tab 4    albuterol (PROVENTIL VENTOLIN) 2.5 mg /3 mL (0.083 %) nebu INHALE 1 VIAL VIA NEBULIZER UP TO 4 TIMES A DAY IF NEEDED FOR SHORTNESS OF BREATH/COUGH/WHEEZE. 300 mL 4    hydrOXYzine pamoate (VISTARIL) 25 mg capsule Take 25 mg by mouth three (3) times daily as needed.  acamprosate (CAMPRAL) 333 mg tablet Take 666 mg by mouth three (3) times daily.  cetirizine (ZYRTEC) 10 mg tablet Take 10 mg by mouth daily. No Known Allergies    Objective:  Visit Vitals  /78 (BP 1 Location: Left arm, BP Patient Position: Sitting, BP Cuff Size: Adult)   Pulse (!) 109   Temp 99.9 °F (37.7 °C) (Oral)   Resp 18   Ht 5' 2\" (1.575 m)   Wt 150 lb 6.4 oz (68.2 kg)   LMP 11/22/2021   SpO2 95% Comment: RA   BMI 27.51 kg/m²     Physical Exam:   Physical Exam  Constitutional:       General: She is not in acute distress. Appearance: Normal appearance. HENT:      Head: Normocephalic and atraumatic. Mouth/Throat:      Mouth: Mucous membranes are moist.   Eyes:      Extraocular Movements: Extraocular movements intact. Conjunctiva/sclera: Conjunctivae normal.      Pupils: Pupils are equal, round, and reactive to light. Cardiovascular:      Rate and Rhythm: Normal rate and regular rhythm. Pulses: Normal pulses. Heart sounds: Normal heart sounds. Pulmonary:      Effort: Pulmonary effort is normal.      Breath sounds: Normal breath sounds. Abdominal:      General: Abdomen is flat. Bowel sounds are normal. There is no distension. Palpations: Abdomen is soft. There is no mass. Tenderness: There is no abdominal tenderness. Musculoskeletal:         General: No swelling or deformity. Cervical back: Normal range of motion and neck supple. Right lower leg: No edema. Left lower leg: No edema. Lymphadenopathy:      Cervical: No cervical adenopathy. Skin:     General: Skin is warm and dry. Capillary Refill: Capillary refill takes less than 2 seconds. Coloration: Skin is not jaundiced or pale. Findings: No erythema or rash. Neurological:      General: No focal deficit present. Mental Status: She is alert and oriented to person, place, and time. Psychiatric:         Mood and Affect: Mood normal.         Behavior: Behavior normal.         Thought Content: Thought content normal.         Judgment: Judgment normal.          Results for orders placed or performed in visit on 12/08/20   PAP IG, APTIMA HPV AND RFX 16/18,45 ([id number])   Result Value Ref Range    Diagnosis Comment     Specimen adequacy Comment     Clinician provided ICD10 Comment     Performed by: Comment     QC reviewed by: Comment     . Jose Maria Mejia Note: Comment     Test methodology Comment     HPV APTIMA Negative Negative       Assessment/Plan:    Repeat blood pressure within normal limits, continue current treatment without changes, check the following labs.   In respect to her mental health, she does not have an indication at this moment of alprazolam, she is drinking alcohol daily I educated her about the interaction between these 2 so this morning not going to prescribe it, psychiatry has already prescribed buspirone so I encouraged her to continue taking it, continue sertraline which I am refilling while she is able to obtain a new follow-up appointment with psychiatry, she was last seen by Dr. Samantha Davila in August   For the COPD and worsening shortness of breath needs to see pulmonary, encouraged to reduce amount of cigarettes and consider quitting      ICD-10-CM ICD-9-CM    1. Need for hepatitis C screening test  Z11.59 V73.89 HEPATITIS C AB   2. Essential hypertension  I10 401.9 CBC WITH AUTOMATED DIFF      METABOLIC PANEL, COMPREHENSIVE      TSH 3RD GENERATION      LIPID PANEL      metoprolol tartrate (LOPRESSOR) 50 mg tablet   3. Mixed anxiety and depressive disorder  F41.8 300.4 sertraline (ZOLOFT) 100 mg tablet   4. Chronic obstructive pulmonary disease, unspecified COPD type (HCC)  J44.9 496 albuterol (PROVENTIL HFA, VENTOLIN HFA, PROAIR HFA) 90 mcg/actuation inhaler      REFERRAL TO PULMONARY DISEASE   5. Tobacco dependence due to cigarettes  F17.210 305.1 REFERRAL TO PULMONARY DISEASE   6. Alcohol use  Z72.89 V49.89      Orders Placed This Encounter    CBC WITH AUTOMATED DIFF    METABOLIC PANEL, COMPREHENSIVE    TSH 3RD GENERATION    LIPID PANEL    HEPATITIS C AB    [facility]     Referral Priority:   Routine     Referral Type:   Consultation     Referral Reason:   Specialty Services Required     Referred to Provider:   Eloy Sr MD     Number of Visits Requested:   1    busPIRone (BUSPAR) 5 mg tablet    metoprolol tartrate (LOPRESSOR) 50 mg tablet     Sig: Take 1 Tablet by mouth two (2) times a day for 180 days. Dispense:  180 Tablet     Refill:  1    albuterol (PROVENTIL HFA, VENTOLIN HFA, PROAIR HFA) 90 mcg/actuation inhaler     Sig: Take 2 Puffs by inhalation every six (6) hours as needed for Wheezing. Dispense:  8.5 g     Refill:  3    sertraline (ZOLOFT) 100 mg tablet     Sig: Take 1 Tablet by mouth daily for 90 days.      Dispense:  90 Tablet     Refill:  0 limit alcohol consumption, follow low fat diet, follow low salt diet, routine labs ordered, call if any problems  There are no Patient Instructions on file for this visit. Follow-up and Dispositions    · Return in about 6 months (around 6/8/2022).

## 2021-12-09 LAB
ALBUMIN SERPL-MCNC: 4.3 G/DL (ref 3.8–4.8)
ALBUMIN/GLOB SERPL: 1.3 {RATIO} (ref 1.2–2.2)
ALP SERPL-CCNC: 149 IU/L (ref 44–121)
ALT SERPL-CCNC: 13 IU/L (ref 0–32)
AST SERPL-CCNC: 20 IU/L (ref 0–40)
BASOPHILS # BLD AUTO: 0.2 X10E3/UL (ref 0–0.2)
BASOPHILS NFR BLD AUTO: 2 %
BILIRUB SERPL-MCNC: 0.3 MG/DL (ref 0–1.2)
BUN SERPL-MCNC: 6 MG/DL (ref 6–24)
BUN/CREAT SERPL: 11 (ref 9–23)
CALCIUM SERPL-MCNC: 9.4 MG/DL (ref 8.7–10.2)
CHLORIDE SERPL-SCNC: 100 MMOL/L (ref 96–106)
CHOLEST SERPL-MCNC: 169 MG/DL (ref 100–199)
CO2 SERPL-SCNC: 23 MMOL/L (ref 20–29)
CREAT SERPL-MCNC: 0.54 MG/DL (ref 0.57–1)
EOSINOPHIL # BLD AUTO: 0.2 X10E3/UL (ref 0–0.4)
EOSINOPHIL NFR BLD AUTO: 2 %
ERYTHROCYTE [DISTWIDTH] IN BLOOD BY AUTOMATED COUNT: 21.1 % (ref 11.7–15.4)
GLOBULIN SER CALC-MCNC: 3.2 G/DL (ref 1.5–4.5)
GLUCOSE SERPL-MCNC: 103 MG/DL (ref 65–99)
HCT VFR BLD AUTO: 27.9 % (ref 34–46.6)
HCV AB S/CO SERPL IA: 0.2 S/CO RATIO (ref 0–0.9)
HDLC SERPL-MCNC: 36 MG/DL
HGB BLD-MCNC: 7.2 G/DL (ref 11.1–15.9)
IMM GRANULOCYTES # BLD AUTO: 0.1 X10E3/UL (ref 0–0.1)
IMM GRANULOCYTES NFR BLD AUTO: 1 %
LDLC SERPL CALC-MCNC: 104 MG/DL (ref 0–99)
LYMPHOCYTES # BLD AUTO: 1.7 X10E3/UL (ref 0.7–3.1)
LYMPHOCYTES NFR BLD AUTO: 18 %
MCH RBC QN AUTO: 15.6 PG (ref 26.6–33)
MCHC RBC AUTO-ENTMCNC: 25.8 G/DL (ref 31.5–35.7)
MCV RBC AUTO: 60 FL (ref 79–97)
MONOCYTES # BLD AUTO: 0.7 X10E3/UL (ref 0.1–0.9)
MONOCYTES NFR BLD AUTO: 8 %
NEUTROPHILS # BLD AUTO: 6.6 X10E3/UL (ref 1.4–7)
NEUTROPHILS NFR BLD AUTO: 69 %
PLATELET # BLD AUTO: 573 X10E3/UL (ref 150–450)
POTASSIUM SERPL-SCNC: 4.3 MMOL/L (ref 3.5–5.2)
PROT SERPL-MCNC: 7.5 G/DL (ref 6–8.5)
RBC # BLD AUTO: 4.63 X10E6/UL (ref 3.77–5.28)
SODIUM SERPL-SCNC: 139 MMOL/L (ref 134–144)
TRIGL SERPL-MCNC: 165 MG/DL (ref 0–149)
TSH SERPL DL<=0.005 MIU/L-ACNC: 0.57 UIU/ML (ref 0.45–4.5)
VLDLC SERPL CALC-MCNC: 29 MG/DL (ref 5–40)
WBC # BLD AUTO: 9.5 X10E3/UL (ref 3.4–10.8)

## 2021-12-14 NOTE — PROGRESS NOTES
Anemia is worse, hemoglobin has decreased significantly and her platelets are increased, this can indicate most likely chronic gastrointestinal bleeding, which can sometimes be caused by malignancy, she needs to see gastroenterology asap, let's please make her an appointment Les and explain her importance of this.  Thanks

## 2022-03-18 PROBLEM — K42.9 UMBILICAL HERNIA: Status: ACTIVE | Noted: 2020-10-14

## 2022-03-18 PROBLEM — F10.10 ALCOHOL ABUSE: Status: ACTIVE | Noted: 2020-07-29

## 2022-03-18 PROBLEM — I10 ESSENTIAL HYPERTENSION: Status: ACTIVE | Noted: 2020-07-29

## 2022-03-18 PROBLEM — M54.9 BACKACHE: Status: ACTIVE | Noted: 2020-07-29

## 2022-03-19 PROBLEM — F17.200 TOBACCO DEPENDENCE SYNDROME: Status: ACTIVE | Noted: 2020-10-14

## 2022-03-19 PROBLEM — F41.8 MIXED ANXIETY AND DEPRESSIVE DISORDER: Status: ACTIVE | Noted: 2020-07-29

## 2022-03-19 PROBLEM — K43.2 RECURRENT VENTRAL INCISIONAL HERNIA: Status: ACTIVE | Noted: 2020-10-14

## 2022-03-19 PROBLEM — E55.9 VITAMIN D DEFICIENCY: Status: ACTIVE | Noted: 2020-07-29

## 2022-03-19 PROBLEM — N92.0 MENORRHAGIA WITH REGULAR CYCLE: Status: ACTIVE | Noted: 2020-12-10

## 2022-03-19 PROBLEM — F17.210 TOBACCO DEPENDENCE DUE TO CIGARETTES: Status: ACTIVE | Noted: 2020-07-29

## 2022-03-19 PROBLEM — J44.9 COPD (CHRONIC OBSTRUCTIVE PULMONARY DISEASE) (HCC): Status: ACTIVE | Noted: 2020-07-29

## 2022-03-19 PROBLEM — F32.A DEPRESSIVE DISORDER: Status: ACTIVE | Noted: 2020-07-29

## 2022-03-20 PROBLEM — D25.9 UTERINE LEIOMYOMA: Status: ACTIVE | Noted: 2020-12-10

## 2022-03-20 PROBLEM — F10.90 ALCOHOL INTAKE ABOVE RECOMMENDED SENSIBLE LIMITS: Status: ACTIVE | Noted: 2020-10-14

## 2023-05-17 RX ORDER — CETIRIZINE HYDROCHLORIDE 10 MG/1
10 TABLET ORAL DAILY
COMMUNITY

## 2023-05-17 RX ORDER — FLUTICASONE FUROATE AND VILANTEROL 100; 25 UG/1; UG/1
POWDER RESPIRATORY (INHALATION)
COMMUNITY
Start: 2021-07-23

## 2023-05-17 RX ORDER — HYDROXYZINE PAMOATE 25 MG/1
25 CAPSULE ORAL 3 TIMES DAILY PRN
COMMUNITY

## 2023-05-17 RX ORDER — BUSPIRONE HYDROCHLORIDE 5 MG/1
TABLET ORAL
COMMUNITY
Start: 2021-08-31

## 2023-05-17 RX ORDER — ALBUTEROL SULFATE 90 UG/1
2 AEROSOL, METERED RESPIRATORY (INHALATION) EVERY 6 HOURS PRN
COMMUNITY
Start: 2021-12-08

## 2023-05-17 RX ORDER — ALBUTEROL SULFATE 2.5 MG/3ML
SOLUTION RESPIRATORY (INHALATION)
COMMUNITY
Start: 2021-02-08

## 2023-05-17 RX ORDER — ACAMPROSATE CALCIUM 333 MG/1
666 TABLET, DELAYED RELEASE ORAL 3 TIMES DAILY
COMMUNITY

## 2023-05-17 RX ORDER — ALPRAZOLAM 0.5 MG/1
1 TABLET ORAL DAILY PRN
COMMUNITY
Start: 2021-03-18